# Patient Record
Sex: FEMALE | Race: WHITE | NOT HISPANIC OR LATINO | Employment: OTHER | URBAN - NONMETROPOLITAN AREA
[De-identification: names, ages, dates, MRNs, and addresses within clinical notes are randomized per-mention and may not be internally consistent; named-entity substitution may affect disease eponyms.]

---

## 2018-02-26 ENCOUNTER — TELEPHONE (OUTPATIENT)
Dept: OPHTHALMOLOGY | Age: 63
End: 2018-02-26

## 2018-02-27 ENCOUNTER — OFFICE VISIT (OUTPATIENT)
Dept: OPHTHALMOLOGY | Age: 63
End: 2018-02-27

## 2018-02-27 DIAGNOSIS — H43.812 POSTERIOR VITREOUS DETACHMENT OF LEFT EYE: Primary | ICD-10-CM

## 2018-02-27 PROCEDURE — 92014 COMPRE OPH EXAM EST PT 1/>: CPT | Performed by: OPHTHALMOLOGY

## 2018-02-27 ASSESSMENT — SLIT LAMP EXAM - LIDS
COMMENTS: 1+ DERMATOCHALASIS - UPPER LID
COMMENTS: 1+ DERMATOCHALASIS - UPPER LID

## 2018-02-27 ASSESSMENT — EXTERNAL EXAM - RIGHT EYE: OD_EXAM: NORMAL

## 2018-02-27 ASSESSMENT — VISUAL ACUITY
OD_CC+: -1
OS_CC: 20/25
OD_CC: 20/20
METHOD: SNELLEN - LINEAR
OS_CC+: -1
CORRECTION_TYPE: GLASSES

## 2018-02-27 ASSESSMENT — TONOMETRY
OD_IOP_MMHG: 16
OS_IOP_MMHG: 15
IOP_METHOD: APPLANATION

## 2018-02-27 ASSESSMENT — CONF VISUAL FIELD
OD_NORMAL: 1
OS_NORMAL: 1
METHOD: COUNTING FINGERS

## 2018-02-27 ASSESSMENT — EXTERNAL EXAM - LEFT EYE: OS_EXAM: NORMAL

## 2018-02-27 ASSESSMENT — CUP TO DISC RATIO
OD_RATIO: 0.6
OS_RATIO: 0.6

## 2018-04-09 ENCOUNTER — TELEPHONE (OUTPATIENT)
Dept: OPHTHALMOLOGY | Age: 63
End: 2018-04-09

## 2018-04-09 ENCOUNTER — OFFICE VISIT (OUTPATIENT)
Dept: OPHTHALMOLOGY | Age: 63
End: 2018-04-09

## 2018-04-09 DIAGNOSIS — H04.552 NASOLACRIMAL DUCT OBSTRUCTION, ACQUIRED, LEFT: ICD-10-CM

## 2018-04-09 DIAGNOSIS — H43.812 POSTERIOR VITREOUS DETACHMENT OF LEFT EYE: Primary | ICD-10-CM

## 2018-04-09 PROCEDURE — 99213 OFFICE O/P EST LOW 20 MIN: CPT | Performed by: OPHTHALMOLOGY

## 2018-04-09 RX ORDER — TOBRAMYCIN 3 MG/ML
1 SOLUTION/ DROPS OPHTHALMIC 4 TIMES DAILY
Qty: 5 ML | Refills: 0 | Status: SHIPPED | OUTPATIENT
Start: 2018-04-09 | End: 2018-04-16

## 2018-04-09 RX ORDER — TOBRAMYCIN AND DEXAMETHASONE 3; 1 MG/ML; MG/ML
1 SUSPENSION/ DROPS OPHTHALMIC 4 TIMES DAILY
Qty: 5 ML | Refills: 1 | Status: CANCELLED | OUTPATIENT
Start: 2018-04-09

## 2018-04-09 ASSESSMENT — VISUAL ACUITY
OS_CC: 20/40
OD_CC: 20/20
OS_PH_CC: NO IMPROVEMENT
METHOD: SNELLEN - LINEAR
OS_CC+: -1
CORRECTION_TYPE: GLASSES

## 2018-04-09 ASSESSMENT — CUP TO DISC RATIO
OD_RATIO: 0.6
OS_RATIO: 0.6

## 2018-04-09 ASSESSMENT — TONOMETRY
IOP_METHOD: APPLANATION
OS_IOP_MMHG: 14

## 2018-04-09 ASSESSMENT — SLIT LAMP EXAM - LIDS
COMMENTS: 1+ DERMATOCHALASIS - UPPER LID
COMMENTS: 1+ DERMATOCHALASIS - UPPER LID

## 2018-04-09 ASSESSMENT — EXTERNAL EXAM - RIGHT EYE: OD_EXAM: NORMAL

## 2018-04-09 ASSESSMENT — EXTERNAL EXAM - LEFT EYE: OS_EXAM: NORMAL

## 2018-04-18 ENCOUNTER — TELEPHONE (OUTPATIENT)
Dept: OPHTHALMOLOGY | Age: 63
End: 2018-04-18

## 2018-05-07 RX ORDER — PREDNISOLONE ACETATE 10 MG/ML
1 SUSPENSION/ DROPS OPHTHALMIC PRN
Qty: 5 ML | Refills: 2 | Status: SHIPPED | OUTPATIENT
Start: 2018-05-07 | End: 2019-04-04 | Stop reason: SDUPTHER

## 2019-02-19 ENCOUNTER — TELEPHONE (OUTPATIENT)
Dept: OPHTHALMOLOGY | Age: 64
End: 2019-02-19

## 2019-02-21 ENCOUNTER — OFFICE VISIT (OUTPATIENT)
Dept: OPHTHALMOLOGY | Age: 64
End: 2019-02-21

## 2019-02-21 DIAGNOSIS — H15.103 EPISCLERITIS OF BOTH EYES: Primary | ICD-10-CM

## 2019-02-21 PROCEDURE — 99213 OFFICE O/P EST LOW 20 MIN: CPT | Performed by: OPHTHALMOLOGY

## 2019-02-21 ASSESSMENT — EXTERNAL EXAM - LEFT EYE: OS_EXAM: NORMAL

## 2019-02-21 ASSESSMENT — TONOMETRY
OD_IOP_MMHG: 13
IOP_METHOD: APPLANATION
OS_IOP_MMHG: 13

## 2019-02-21 ASSESSMENT — CUP TO DISC RATIO
OS_RATIO: 0.6
OD_RATIO: 0.6

## 2019-02-21 ASSESSMENT — SLIT LAMP EXAM - LIDS
COMMENTS: 1+ DERMATOCHALASIS - UPPER LID
COMMENTS: 1+ DERMATOCHALASIS - UPPER LID

## 2019-02-21 ASSESSMENT — CONF VISUAL FIELD
OS_NORMAL: 1
OD_NORMAL: 1
METHOD: COUNTING FINGERS

## 2019-02-21 ASSESSMENT — VISUAL ACUITY
METHOD: SNELLEN - LINEAR
OS_PH_CC+: -2
CORRECTION_TYPE: GLASSES
OS_CC: 20/30
OS_PH_CC: 20/25
OD_CC+: -2
OD_CC: 20/25
OS_CC+: -1

## 2019-02-21 ASSESSMENT — EXTERNAL EXAM - RIGHT EYE: OD_EXAM: NORMAL

## 2019-02-28 ENCOUNTER — TELEPHONE (OUTPATIENT)
Dept: OPHTHALMOLOGY | Age: 64
End: 2019-02-28

## 2019-04-03 ENCOUNTER — TELEPHONE (OUTPATIENT)
Dept: OPHTHALMOLOGY | Age: 64
End: 2019-04-03

## 2019-04-03 RX ORDER — SULFAMETHOXAZOLE AND TRIMETHOPRIM 800; 160 MG/1; MG/1
1 TABLET ORAL 2 TIMES DAILY
Qty: 20 TABLET | Refills: 0 | Status: SHIPPED | OUTPATIENT
Start: 2019-04-03 | End: 2019-04-13

## 2019-04-04 ENCOUNTER — OFFICE VISIT (OUTPATIENT)
Dept: OPHTHALMOLOGY | Age: 64
End: 2019-04-04

## 2019-04-04 DIAGNOSIS — H04.552 ACQUIRED OBSTRUCTION OF LEFT NASOLACRIMAL DUCT: Primary | ICD-10-CM

## 2019-04-04 DIAGNOSIS — H52.7 REFRACTIVE ERROR: ICD-10-CM

## 2019-04-04 PROCEDURE — 92014 COMPRE OPH EXAM EST PT 1/>: CPT | Performed by: OPHTHALMOLOGY

## 2019-04-04 PROCEDURE — 92015 DETERMINE REFRACTIVE STATE: CPT | Performed by: OPHTHALMOLOGY

## 2019-04-04 RX ORDER — PREDNISOLONE ACETATE 10 MG/ML
1 SUSPENSION/ DROPS OPHTHALMIC PRN
Qty: 5 ML | Refills: 2 | Status: SHIPPED | OUTPATIENT
Start: 2019-04-04 | End: 2020-03-02 | Stop reason: SDUPTHER

## 2019-04-04 ASSESSMENT — TONOMETRY
OD_IOP_MMHG: 12
OS_IOP_MMHG: 12
IOP_METHOD: APPLANATION

## 2019-04-04 ASSESSMENT — REFRACTION_WEARINGRX
OD_CYLINDER: SPHERE
SPECS_TYPE: PROGRESSIVE
OD_ADD: +2.50
OS_SPHERE: -3.00
OS_ADD: +2.50
OS_CYLINDER: SPHERE
OD_SPHERE: -4.25

## 2019-04-04 ASSESSMENT — CONF VISUAL FIELD
METHOD: COUNTING FINGERS
OS_NORMAL: 1
OD_NORMAL: 1

## 2019-04-04 ASSESSMENT — REFRACTION_MANIFEST
OS_ADD: +2.50
OS_CYLINDER: SPHERE
OS_SPHERE: -3.75
OD_ADD: +2.50
OD_CYLINDER: SPHERE
OD_SPHERE: -4.00

## 2019-04-04 ASSESSMENT — VISUAL ACUITY
OS_CC+: -2
METHOD: SNELLEN - LINEAR
OD_CC: 20/20
OD_CC: 20/25
CORRECTION_TYPE: GLASSES
OS_CC: 20/30
OS_CC: 20/20-1

## 2019-04-04 ASSESSMENT — CUP TO DISC RATIO
OS_RATIO: 0.6
OD_RATIO: 0.6

## 2019-04-04 ASSESSMENT — EXTERNAL EXAM - RIGHT EYE: OD_EXAM: NORMAL

## 2019-04-04 ASSESSMENT — SLIT LAMP EXAM - LIDS
COMMENTS: 1+ DERMATOCHALASIS - UPPER LID
COMMENTS: 1+ DERMATOCHALASIS - UPPER LID

## 2019-04-11 ENCOUNTER — TELEPHONE (OUTPATIENT)
Dept: OPHTHALMOLOGY | Age: 64
End: 2019-04-11

## 2020-03-02 ENCOUNTER — TELEPHONE (OUTPATIENT)
Dept: OPHTHALMOLOGY | Age: 65
End: 2020-03-02

## 2020-03-02 RX ORDER — PREDNISOLONE ACETATE 10 MG/ML
1 SUSPENSION/ DROPS OPHTHALMIC PRN
Qty: 5 ML | Refills: 2 | Status: SHIPPED | OUTPATIENT
Start: 2020-03-02 | End: 2021-04-26 | Stop reason: SDUPTHER

## 2020-10-06 ENCOUNTER — OFFICE VISIT (OUTPATIENT)
Dept: OPHTHALMOLOGY | Age: 65
End: 2020-10-06

## 2020-10-06 DIAGNOSIS — H25.813 COMBINED FORMS OF AGE-RELATED CATARACT OF BOTH EYES: Primary | ICD-10-CM

## 2020-10-06 DIAGNOSIS — H52.4 MYOPIA WITH PRESBYOPIA OF BOTH EYES: ICD-10-CM

## 2020-10-06 DIAGNOSIS — H52.13 MYOPIA WITH PRESBYOPIA OF BOTH EYES: ICD-10-CM

## 2020-10-06 DIAGNOSIS — H15.103 EPISCLERITIS OF BOTH EYES: ICD-10-CM

## 2020-10-06 DIAGNOSIS — G43.109 OCULAR MIGRAINE: ICD-10-CM

## 2020-10-06 DIAGNOSIS — H04.552 ACQUIRED OBSTRUCTION OF LEFT NASOLACRIMAL DUCT: ICD-10-CM

## 2020-10-06 PROCEDURE — 92014 COMPRE OPH EXAM EST PT 1/>: CPT | Performed by: OPHTHALMOLOGY

## 2020-10-06 RX ORDER — ATORVASTATIN CALCIUM 10 MG/1
10 TABLET, FILM COATED ORAL DAILY
COMMUNITY
Start: 2020-03-16 | End: 2021-03-16

## 2020-10-06 ASSESSMENT — TONOMETRY
OD_IOP_MMHG: 14
OS_IOP_MMHG: 14
IOP_METHOD: APPLANATION

## 2020-10-06 ASSESSMENT — REFRACTION_WEARINGRX
OD_ADD: +2.50
OS_CYLINDER: SPHERE
OS_SPHERE: -3.25
OD_CYLINDER: SPHERE
OD_SPHERE: -4.00
OS_ADD: +2.50
SPECS_TYPE: PROGRESSIVE

## 2020-10-06 ASSESSMENT — VISUAL ACUITY
OD_CC: JAEGER 1
OS_CC: 20/25
CORRECTION_TYPE: GLASSES
OS_CC+: -2
OS_CC: JAEGER 1
OD_CC+: -1
METHOD: SNELLEN - LINEAR
OD_CC: 20/25

## 2020-10-06 ASSESSMENT — SLIT LAMP EXAM - LIDS
COMMENTS: 1+ DERMATOCHALASIS - UPPER LID
COMMENTS: 1+ DERMATOCHALASIS - UPPER LID

## 2020-10-06 ASSESSMENT — CUP TO DISC RATIO
OS_RATIO: 0.6
OD_RATIO: 0.6

## 2020-10-06 ASSESSMENT — EXTERNAL EXAM - RIGHT EYE: OD_EXAM: NORMAL

## 2020-10-06 ASSESSMENT — CONF VISUAL FIELD
OD_NORMAL: 1
METHOD: COUNTING FINGERS
OS_NORMAL: 1

## 2020-10-06 ASSESSMENT — ENCOUNTER SYMPTOMS
EYES NEGATIVE: 1
CONSTITUTIONAL NEGATIVE: 0

## 2021-04-26 RX ORDER — PREDNISOLONE ACETATE 10 MG/ML
1 SUSPENSION/ DROPS OPHTHALMIC PRN
Qty: 5 ML | Refills: 2 | Status: SHIPPED | OUTPATIENT
Start: 2021-04-26 | End: 2021-10-26 | Stop reason: ALTCHOICE

## 2021-05-21 ENCOUNTER — TELEPHONE (OUTPATIENT)
Dept: OPHTHALMOLOGY | Age: 66
End: 2021-05-21

## 2021-05-24 ENCOUNTER — OFFICE VISIT (OUTPATIENT)
Dept: OPHTHALMOLOGY | Age: 66
End: 2021-05-24

## 2021-05-24 ENCOUNTER — TELEPHONE (OUTPATIENT)
Dept: OPHTHALMOLOGY | Age: 66
End: 2021-05-24

## 2021-05-24 DIAGNOSIS — H04.301 DACRYOCYSTITIS OF RIGHT LACRIMAL SAC: Primary | ICD-10-CM

## 2021-05-24 DIAGNOSIS — H15.103 EPISCLERITIS OF BOTH EYES: ICD-10-CM

## 2021-05-24 DIAGNOSIS — H25.813 COMBINED FORMS OF AGE-RELATED CATARACT OF BOTH EYES: ICD-10-CM

## 2021-05-24 DIAGNOSIS — H52.13 MYOPIA WITH PRESBYOPIA OF BOTH EYES: ICD-10-CM

## 2021-05-24 DIAGNOSIS — H04.553 ACQUIRED OBSTRUCTION OF BOTH NASOLACRIMAL DUCTS: ICD-10-CM

## 2021-05-24 DIAGNOSIS — H52.4 MYOPIA WITH PRESBYOPIA OF BOTH EYES: ICD-10-CM

## 2021-05-24 DIAGNOSIS — G43.109 OCULAR MIGRAINE: ICD-10-CM

## 2021-05-24 PROCEDURE — 99213 OFFICE O/P EST LOW 20 MIN: CPT | Performed by: OPHTHALMOLOGY

## 2021-05-24 RX ORDER — OFLOXACIN 3 MG/ML
1 SOLUTION/ DROPS OPHTHALMIC 4 TIMES DAILY
Qty: 5 ML | Refills: 1 | Status: SHIPPED | OUTPATIENT
Start: 2021-05-24 | End: 2021-06-03

## 2021-05-24 RX ORDER — SULFAMETHOXAZOLE AND TRIMETHOPRIM 800; 160 MG/1; MG/1
1 TABLET ORAL 2 TIMES DAILY
Qty: 20 TABLET | Refills: 0 | Status: SHIPPED | OUTPATIENT
Start: 2021-05-24 | End: 2021-11-26 | Stop reason: SDUPTHER

## 2021-05-24 ASSESSMENT — ENCOUNTER SYMPTOMS
EYES NEGATIVE: 1
CONSTITUTIONAL NEGATIVE: 0

## 2021-05-24 ASSESSMENT — VISUAL ACUITY
METHOD: SNELLEN - LINEAR
OS_CC: 20/25
OS_CC+: -2
OD_CC: 20/30
CORRECTION_TYPE: GLASSES
OD_CC+: -2

## 2021-05-24 ASSESSMENT — TONOMETRY
OD_IOP_MMHG: 17
IOP_METHOD: APPLANATION

## 2021-05-24 ASSESSMENT — CUP TO DISC RATIO
OD_RATIO: 0.6
OS_RATIO: 0.6

## 2021-05-24 ASSESSMENT — EXTERNAL EXAM - LEFT EYE: OS_EXAM: NORMAL

## 2021-05-24 ASSESSMENT — SLIT LAMP EXAM - LIDS: COMMENTS: 1+ DERMATOCHALASIS - UPPER LID

## 2021-05-24 ASSESSMENT — CONF VISUAL FIELD: OS_NORMAL: 1

## 2021-05-25 ENCOUNTER — TELEPHONE (OUTPATIENT)
Dept: OPHTHALMOLOGY | Age: 66
End: 2021-05-25

## 2021-05-27 ENCOUNTER — TELEPHONE (OUTPATIENT)
Dept: OPHTHALMOLOGY | Age: 66
End: 2021-05-27

## 2021-05-28 ENCOUNTER — TELEPHONE (OUTPATIENT)
Dept: OPHTHALMOLOGY | Age: 66
End: 2021-05-28

## 2021-05-29 ENCOUNTER — TELEPHONE (OUTPATIENT)
Dept: OPHTHALMOLOGY | Age: 66
End: 2021-05-29

## 2021-06-01 ENCOUNTER — TELEPHONE (OUTPATIENT)
Dept: OPHTHALMOLOGY | Age: 66
End: 2021-06-01

## 2021-07-16 ENCOUNTER — TELEPHONE (OUTPATIENT)
Dept: OPHTHALMOLOGY | Age: 66
End: 2021-07-16

## 2021-09-03 ENCOUNTER — TELEPHONE (OUTPATIENT)
Dept: OPHTHALMOLOGY | Age: 66
End: 2021-09-03

## 2021-09-28 ENCOUNTER — TELEPHONE (OUTPATIENT)
Dept: OPHTHALMOLOGY | Age: 66
End: 2021-09-28

## 2021-09-30 ENCOUNTER — TELEPHONE (OUTPATIENT)
Dept: OPHTHALMOLOGY | Age: 66
End: 2021-09-30

## 2021-10-01 ENCOUNTER — OFFICE VISIT (OUTPATIENT)
Dept: OPHTHALMOLOGY | Age: 66
End: 2021-10-01

## 2021-10-01 ENCOUNTER — TELEPHONE (OUTPATIENT)
Dept: OPHTHALMOLOGY | Age: 66
End: 2021-10-01

## 2021-10-01 DIAGNOSIS — H15.103 EPISCLERITIS OF BOTH EYES: Primary | ICD-10-CM

## 2021-10-01 DIAGNOSIS — H25.813 COMBINED FORMS OF AGE-RELATED CATARACT OF BOTH EYES: ICD-10-CM

## 2021-10-01 DIAGNOSIS — G43.109 OCULAR MIGRAINE: ICD-10-CM

## 2021-10-01 DIAGNOSIS — H04.553 ACQUIRED OBSTRUCTION OF BOTH NASOLACRIMAL DUCTS: ICD-10-CM

## 2021-10-01 DIAGNOSIS — H52.4 MYOPIA WITH PRESBYOPIA OF BOTH EYES: ICD-10-CM

## 2021-10-01 DIAGNOSIS — H52.13 MYOPIA WITH PRESBYOPIA OF BOTH EYES: ICD-10-CM

## 2021-10-01 PROCEDURE — 99213 OFFICE O/P EST LOW 20 MIN: CPT | Performed by: OPHTHALMOLOGY

## 2021-10-01 RX ORDER — SULFAMETHOXAZOLE AND TRIMETHOPRIM 800; 160 MG/1; MG/1
TABLET ORAL
COMMUNITY
Start: 2021-09-28

## 2021-10-01 RX ORDER — OFLOXACIN 3 MG/ML
SOLUTION/ DROPS OPHTHALMIC
COMMUNITY
Start: 2021-09-28 | End: 2021-10-26 | Stop reason: ALTCHOICE

## 2021-10-01 ASSESSMENT — TONOMETRY
IOP_METHOD: APPLANATION
OD_IOP_MMHG: 15
OS_IOP_MMHG: 15

## 2021-10-01 ASSESSMENT — VISUAL ACUITY
OD_PH_CC+: -1
OD_PH_CC: 20/25
OD_CC+: +2
OD_CC: 20/40
CORRECTION_TYPE: GLASSES
METHOD: SNELLEN - LINEAR
OS_CC: 20/40
OS_PH_CC: 20/25

## 2021-10-01 ASSESSMENT — SLIT LAMP EXAM - LIDS: COMMENTS: 1+ DERMATOCHALASIS - UPPER LID

## 2021-10-01 ASSESSMENT — CUP TO DISC RATIO
OD_RATIO: 0.6
OS_RATIO: 0.6

## 2021-10-01 ASSESSMENT — ENCOUNTER SYMPTOMS
CONSTITUTIONAL NEGATIVE: 0
EYES NEGATIVE: 1

## 2021-10-01 ASSESSMENT — EXTERNAL EXAM - LEFT EYE: OS_EXAM: NORMAL

## 2021-10-05 ENCOUNTER — TELEPHONE (OUTPATIENT)
Dept: OPHTHALMOLOGY | Age: 66
End: 2021-10-05

## 2021-10-14 ENCOUNTER — TELEPHONE (OUTPATIENT)
Dept: OPHTHALMOLOGY | Age: 66
End: 2021-10-14

## 2021-10-26 ENCOUNTER — TRANSFERRED RECORDS (OUTPATIENT)
Dept: HEALTH INFORMATION MANAGEMENT | Facility: CLINIC | Age: 66
End: 2021-10-26
Payer: MEDICARE

## 2021-10-26 ENCOUNTER — OFFICE VISIT (OUTPATIENT)
Dept: OPHTHALMOLOGY | Age: 66
End: 2021-10-26

## 2021-10-26 DIAGNOSIS — H04.553 ACQUIRED OBSTRUCTION OF BOTH NASOLACRIMAL DUCTS: ICD-10-CM

## 2021-10-26 DIAGNOSIS — H52.4 MYOPIA WITH PRESBYOPIA OF BOTH EYES: ICD-10-CM

## 2021-10-26 DIAGNOSIS — H25.813 COMBINED FORMS OF AGE-RELATED CATARACT OF BOTH EYES: Primary | ICD-10-CM

## 2021-10-26 DIAGNOSIS — H15.103 EPISCLERITIS OF BOTH EYES: ICD-10-CM

## 2021-10-26 DIAGNOSIS — H52.13 MYOPIA WITH PRESBYOPIA OF BOTH EYES: ICD-10-CM

## 2021-10-26 DIAGNOSIS — G43.109 OCULAR MIGRAINE: ICD-10-CM

## 2021-10-26 PROCEDURE — 92015 DETERMINE REFRACTIVE STATE: CPT | Performed by: OPHTHALMOLOGY

## 2021-10-26 PROCEDURE — 92014 COMPRE OPH EXAM EST PT 1/>: CPT | Performed by: OPHTHALMOLOGY

## 2021-10-26 RX ORDER — ATORVASTATIN CALCIUM 10 MG/1
10 TABLET, FILM COATED ORAL DAILY
COMMUNITY

## 2021-10-26 ASSESSMENT — TONOMETRY
IOP_METHOD: APPLANATION
OS_IOP_MMHG: 15
OD_IOP_MMHG: 16

## 2021-10-26 ASSESSMENT — VISUAL ACUITY
OS_CC+: -1
METHOD: SNELLEN - LINEAR
OD_CC: JAEGER 2-
OD_CC+: -2
OD_CC: 20/25
CORRECTION_TYPE: GLASSES
OS_CC: JAEGER 2-
OS_CC: 20/25

## 2021-10-26 ASSESSMENT — REFRACTION_WEARINGRX
OD_SPHERE: -4.00
OD_CYLINDER: SPHERE
OS_SPHERE: -3.25
SPECS_TYPE: PROGRESSIVE
OD_ADD: +2.50
OS_CYLINDER: SPHERE
OS_ADD: +2.50

## 2021-10-26 ASSESSMENT — CUP TO DISC RATIO
OS_RATIO: 0.6
OD_RATIO: 0.6

## 2021-10-26 ASSESSMENT — SLIT LAMP EXAM - LIDS
COMMENTS: 1+ DERMATOCHALASIS - UPPER LID
COMMENTS: 1+ DERMATOCHALASIS - UPPER LID

## 2021-10-26 ASSESSMENT — EXTERNAL EXAM - RIGHT EYE: OD_EXAM: NORMAL

## 2021-10-26 ASSESSMENT — CONF VISUAL FIELD
OS_NORMAL: 1
OD_NORMAL: 1
METHOD: COUNTING FINGERS

## 2021-10-26 ASSESSMENT — REFRACTION_MANIFEST
OD_ADD: +2.50
OS_SPHERE: -2.50
OD_SPHERE: -3.25
OS_ADD: +2.50

## 2021-10-26 ASSESSMENT — EXTERNAL EXAM - LEFT EYE: OS_EXAM: NORMAL

## 2021-10-26 ASSESSMENT — ENCOUNTER SYMPTOMS
CONSTITUTIONAL NEGATIVE: 0
EYES NEGATIVE: 1

## 2021-11-26 ENCOUNTER — TELEPHONE (OUTPATIENT)
Dept: OPHTHALMOLOGY | Age: 66
End: 2021-11-26

## 2021-11-26 RX ORDER — SULFAMETHOXAZOLE AND TRIMETHOPRIM 800; 160 MG/1; MG/1
1 TABLET ORAL 2 TIMES DAILY
Qty: 20 TABLET | Refills: 0 | Status: SHIPPED | OUTPATIENT
Start: 2021-11-26 | End: 2021-12-06

## 2021-12-03 ENCOUNTER — TELEPHONE (OUTPATIENT)
Dept: OPHTHALMOLOGY | Age: 66
End: 2021-12-03

## 2021-12-17 ENCOUNTER — TELEPHONE (OUTPATIENT)
Dept: OPHTHALMOLOGY | Age: 66
End: 2021-12-17

## 2021-12-28 NOTE — TELEPHONE ENCOUNTER
FUTURE VISIT INFORMATION      FUTURE VISIT INFORMATION:    Date: 1/24/22    Time: 1:45pm    Location: [facility] [place]  REFERRAL INFORMATION:    Referring provider:  Dr Oliveira     Referring providers clinic:   [facility]     Reason for visit/diagnosis  blocked tear duct    RECORDS REQUESTED FROM:       Clinic name Comments Records Status Imaging Status    [facility]  Request for recs sent 12/28- received and sent to scanning 1/6 EPIC

## 2022-01-19 ENCOUNTER — TELEPHONE (OUTPATIENT)
Dept: OPHTHALMOLOGY | Facility: CLINIC | Age: 67
End: 2022-01-19
Payer: MEDICARE

## 2022-01-19 NOTE — TELEPHONE ENCOUNTER
Spoke with patient regarding rescheduling need due to Provider is out of clinic. Patient was able to reschedule for 2/14/2022 as offered for next available. Sent appointment letter to confirmed address.-Per Patient

## 2022-01-20 NOTE — TELEPHONE ENCOUNTER
FUTURE VISIT INFORMATION      FUTURE VISIT INFORMATION:    Date: 2/14/22    Time: 12:15pm    Location: [facility]  REFERRAL INFORMATION:    Referring provider:  Dr Oliveira     Referring providers clinic:   [facility]     Reason for visit/diagnosis  blocked tear duct     RECORDS REQUESTED FROM:         Clinic name Comments Records Status Imaging Status    [facility]  Request for recs sent 12/28- received and sent to scanning 1/6 EPIC

## 2022-01-24 ENCOUNTER — PRE VISIT (OUTPATIENT)
Dept: OPHTHALMOLOGY | Facility: CLINIC | Age: 67
End: 2022-01-24

## 2022-02-14 ENCOUNTER — EXTERNAL RECORD (OUTPATIENT)
Dept: OTHER | Age: 67
End: 2022-02-14

## 2022-02-14 ENCOUNTER — TELEPHONE (OUTPATIENT)
Dept: OPHTHALMOLOGY | Facility: CLINIC | Age: 67
End: 2022-02-14

## 2022-02-14 ENCOUNTER — OFFICE VISIT (OUTPATIENT)
Dept: OPHTHALMOLOGY | Facility: CLINIC | Age: 67
End: 2022-02-14
Payer: MEDICARE

## 2022-02-14 ENCOUNTER — PRE VISIT (OUTPATIENT)
Dept: OPHTHALMOLOGY | Facility: CLINIC | Age: 67
End: 2022-02-14

## 2022-02-14 VITALS — BODY MASS INDEX: 21.26 KG/M2 | WEIGHT: 120 LBS | HEIGHT: 63 IN

## 2022-02-14 DIAGNOSIS — H04.553 ACQUIRED STENOSIS OF BOTH NASOLACRIMAL DUCTS: Primary | ICD-10-CM

## 2022-02-14 PROCEDURE — 68801 DILATE TEAR DUCT OPENING: CPT | Mod: 50 | Performed by: OPHTHALMOLOGY

## 2022-02-14 PROCEDURE — 31231 NASAL ENDOSCOPY DX: CPT | Mod: GC | Performed by: OPHTHALMOLOGY

## 2022-02-14 PROCEDURE — 99203 OFFICE O/P NEW LOW 30 MIN: CPT | Mod: GC | Performed by: OPHTHALMOLOGY

## 2022-02-14 RX ORDER — ATORVASTATIN CALCIUM 10 MG/1
10 TABLET, FILM COATED ORAL EVERY EVENING
COMMUNITY
Start: 2021-11-18

## 2022-02-14 ASSESSMENT — VISUAL ACUITY
OS_CC: 20/25
CORRECTION_TYPE: GLASSES
OD_PH_CC+: -3
METHOD: SNELLEN - LINEAR
OD_PH_CC: 20/20
OD_CC: 20/25

## 2022-02-14 ASSESSMENT — TONOMETRY
OS_IOP_MMHG: 11
IOP_METHOD: ICARE
OD_IOP_MMHG: 10

## 2022-02-14 ASSESSMENT — MIFFLIN-ST. JEOR: SCORE: 1048.45

## 2022-02-14 ASSESSMENT — CONF VISUAL FIELD
OS_NORMAL: 1
COMMENTS: SOME PEAKING OU
OD_NORMAL: 1

## 2022-02-14 ASSESSMENT — EXTERNAL EXAM - RIGHT EYE: OD_EXAM: NORMAL

## 2022-02-14 ASSESSMENT — EXTERNAL EXAM - LEFT EYE: OS_EXAM: NORMAL

## 2022-02-14 NOTE — PROGRESS NOTES
Chief Complaints and History of Present Illnesses   Patient presents with     Consult For     Possible need for scope and irrigation. History with Acquired nasolacrimal duct obstruction both eyes with dacryocystitis.      Chief Complaint(s) and History of Present Illness(es)     Consult For     In both eyes.  Associated symptoms include tearing and redness.  Negative   for eye pain and discharge.  Treatments tried include no treatments.  Pain   was noted as 0/10. Additional comments: Possible need for scope and   irrigation. History with Acquired nasolacrimal duct obstruction both eyes   with dacryocystitis.               Comments     Initial issues with left eye 5 years ago left eye left eye had a possible   scope and irrigation and this and no infections with left eye as a result.   Watering each eye has been an issues for years. Last spring 2021 right eye   tear duct infection swollen with several infection until multiples. Had to   take a few rounds of oral antibiotics unitl this resolved(A sulfa drug was   used.)Many bouts of this with left eye Oct, November and December. Its   been two month now since symptoms an infection however each eye still   water a lot. Watering is intermittent with no pattern of onset.   She reports also having a History of episcleritis each eye. This also   needs to be treat aggressively when it does come about.   Not having any issues with vision issues.     Savanna Ash, COT COT 12:44 PM February 14, 2022                  History of tearing left eye>OD, but in May she noticed first episode of right nasal bump with purulent discharge with palpation, that spread to significant right periorbital edema and erythema s/p course of PO antibiotics. She had a recurrent episodes in Oct, Nov, Dec.     Assessment & Plan     Ela Cardenas is a 66 year old female with the following diagnoses:   1. Acquired stenosis of both nasolacrimal ducts       History of recurrent dacryocystitis -  right  Irrigation today - bilateral blockage  Nasal endoscopy - septum deviated towards the right    Discussed external vs endoscopic approach, and pt prefers external DCR    Plan:  Bilateral external DCR with silicone stents          Kelly Farr MD  Oculoplastics Fellow    Attending Physician Attestation:  I have seen and examined this patient with the fellow .  I have confirmed and edited as necessary the chief complaint(s), history of present illness, review of systems, relevant history, and examination findings as documented by others.  I have personally reviewed the relevant tests, images, and reports as documented above.  I have confirmed and edited as necessary the assessment and plan and agree with this note.    - Reinier Paul MD 1:26 PM 2/14/2022    Today with Ela Cardenas , I reviewed the indications, risks, benefits, and alternatives of the proposed surgical procedure including, but not limited to, failure obtain the desired result  and need for additional surgery, bleeding, infection, loss of vision, loss of the eye, and the remote possibility of permanent damage to any organ system or death with the use of anesthesia.  I provided multiple opportunities for the questions, answered all questions to the best of my ability, and confirmed that my answers and my discussion were understood.     - Reinier Paul MD 1:26 PM 2/14/2022

## 2022-02-14 NOTE — NURSING NOTE
Chief Complaints and History of Present Illnesses   Patient presents with     Consult For     Possible need for scope and irrigation. History with Acquired nasolacrimal duct obstruction both eyes with dacryocystitis.      Chief Complaint(s) and History of Present Illness(es)     Consult For     Laterality: both eyes    Associated symptoms: tearing and redness.  Negative for eye pain and discharge    Treatments tried: no treatments    Pain scale: 0/10    Comments: Possible need for scope and irrigation. History with Acquired nasolacrimal duct obstruction both eyes with dacryocystitis.               Comments     Initial issues with left eye 5 years ago left eye left eye had a possible scope and irrigation and this and no infections with left eye as a result. Watering each eye has been an issues for years. Last spring 2021 right eye tear duct infection swollen with several infection until multiples. Had to take a few rounds of oral antibiotics unitl this resolved(A sulfa drug was used.)Many bouts of this with left eye Oct, November and December. Its been two month now since symptoms an infection however each eye still water a lot. Watering is intermittent with no pattern of onset.   She reports also having a History of episcleritis each eye. This also needs to be treat aggressively when it does come about.   Not having any issues with vision issues.     Savanna Aleman, COT COT 12:44 PM February 14, 2022

## 2022-02-14 NOTE — PATIENT INSTRUCTIONS
TEAR SYSTEM  The tear film on the surface of the eye is a critical component of maintaining vision. Tears nourish and lubricate the surface of the eye as well as wash away debris. A smooth, balanced tear film (consisting of water, oil and mucus) also allows light to enter the eye in an optimal fashion. If there is a disturbance of the tear film, patients will often experience tearing, burning, irritation and most importantly blurred vision. Patients who experience tearing either have a problem with tear production or tear drainage.    Increased Tear Production and Dry Eyes  The eye has two sets of structures that produce tears. Smaller tear glands help maintain a baseline level of moisture on the surface of the eye. Unfortunately, inflammatory conditions like rheumatoid arthritis, Sjogrens disease as well as aging and menopause lead to decreased tear production. As tear production diminishes, the surface of the eye starts to dry out. Further, inflammation of the oil glands along the edge of the eyelid, common in patients with roseacea, also causes early breakdown and evaporation of the tear film. The brain senses the eye is both dry and irritated and in turn signals the main tear gland to flush the eye. As a result, the dry eye paradoxically tears and becomes watery. Patients with dry eyes note intermittent tearing of the eyes during activities like reading, driving, watching TV, using a computer or going outside on a windy day. These all cause the eye to dry out because the eye blinks less during these activities. The treatment for dry eyes includes 1) replacing tears with artificial lubricants which can be bought over the counter, 2) medications like Restasis that decrease inflammation in tear glands and encourages natural tear production to resume and finally 3) plugging of the tear drain. Other causes of increased tear production exist like allergies, infections and eyelashes poking the eye. These conditions can  often be found during examination.       What Are the Causes of Obstructed Tear Ducts?  An obstruction of the tear ducts may occur due to numerous reasons (aging, trauma, inflammatory conditions, medications and tumors) and cause numerous signs and symptoms ranging from wateriness or tearing to discharge, swelling, pain and infection. These signs and symptoms may result from the tear drainage system becoming obstructed at any point from the puncta to the nasal cavity.       What Are the Symptoms of Obstructed Tear Ducts?  If the tear passageways become blocked, tears cannot drain properly and may overflow from the eyelids onto the face as if you were crying. In addition to excessive tearing you may also experience blurred vision, mucous discharge, eye irritation, and painful swelling in the inner corner of the eyelids. A thorough examination by an ophthalmic plastic surgeon can determine the cause of tearing and recommended treatment.     How is an Obstructed Tear Duct Treated or Repaired?   Depending on your symptoms and their severity, your specialist will suggest an appropriate course. In mild cases, a treatment of warm compresses and antibiotics may be recommended. In more severe cases, surgical intervention to bypass the tear duct obstruction (dacryocystorhinostomy or DCR surgery) may be recommended. A DCR is performed by creating a new tear passageway from the lacrimal sac to the nose, bypassing the obstruction. This procedure may be perfomed through a small incision between the eye and nose (external DCR) or through the nose using a special lighted telescope (endoscopic DCR). A small silicone tube called a stent may temporarily be placed in the new passageway to keep it open during the healing process. In a small percentage of cases, the obstruction is between the puncta and the lacrimal sac. In these cases, in addition to the DCR procedure, the surgeon will insert a tiny artificial tear drain called a Martin  Tube. A Martin Tube is made of Pyrex glass and allows tears to drain directly from the eye to the lacrimal sac.       Where Is The Surgery Performed?  DCR surgery is usually performed as an outpatient procedure. Patients usually have some bruising and swelling on the side of the nose that subsides in one to two weeks. In general, surgery has a greater than 90% success rate and most patients experience a resolution of their tearing and discharge problems once surgery and recovery are completed.       Who Should Perform DCR Surgery?  When choosing a surgeon to perform a dacryocystorhinostomy or DCR, look for an ophthalmic plastic reconstructive and cosmetic surgeon who specializes in the eyelids, orbit, and tear drain system. It s also important that he or she is a member of the American Society of Ophthalmic Plastic and Reconstructive Surgery (ASOPRS). Dr. Paul membership in the American Society of Ophthalmic Plastic and Reconstructive Surgery (ASOPRS) indicates he is not only a board certified ophthalmologist who knows the anatomy and structure of the eyelids and orbit, but also has had extensive training in ophthalmic plastic reconstructive and cosmetic surgery.

## 2022-02-14 NOTE — LETTER
2022         RE:  :  MRN: Ela Cardenas  1955  [id number]     Dear Dr. Oliveira,    Thank you for asking me to see your patient, Ela Cardenas, for an oculoplastic   consultation.  My assessment and plan are below.  For further details, please see my attached clinic note.      History of tearing left eye>OD, but in May she noticed first episode of right nasal bump with purulent discharge with palpation, that spread to significant right periorbital edema and erythema s/p course of PO antibiotics. She had a recurrent episodes in Oct, Nov, Dec.     Assessment & Plan     Ela Cardenas is a 66 year old female with the following diagnoses:   1. Acquired stenosis of both nasolacrimal ducts       History of recurrent dacryocystitis - right  Irrigation today - bilateral blockage  Nasal endoscopy - septum deviated towards the right    Discussed external vs endoscopic approach, and pt prefers external DCR    Plan:  Bilateral external DCR with silicone stents        Again, thank you for allowing me to participate in the care of your patient.      Sincerely,    Reinier Paul MD  Department of Ophthalmology and Visual Neurosciences  [facility]    CC: Tate Samayoa MD  [facility]  [id number] [facility] [address]  Via Fax: [phone]     Royer Oliveira MD  [facility]  [facility] [address] [facility] [address]  Via Fax: [phone]

## 2022-02-14 NOTE — TELEPHONE ENCOUNTER
Met with patient to schedule surgery with Dr. Reinier Paul.    Surgery was scheduled on 04/20 at [facility]  Patient will have H&P at [facility]     Patient is aware a COVID-19 test is needed before their procedure. The test should be with-in 4 days of their procedure.   Test Details: Patient will schedule locally at [facility]    Post-Op visit was scheduled on 05/02 via phone   Patient is aware a / is needed day of surgery.   Surgery packet was given, patient has my direct contact information for any further questions.

## 2022-03-10 DIAGNOSIS — Z11.59 ENCOUNTER FOR SCREENING FOR OTHER VIRAL DISEASES: Primary | ICD-10-CM

## 2022-04-19 ENCOUNTER — ANESTHESIA EVENT (OUTPATIENT)
Dept: SURGERY | Facility: AMBULATORY SURGERY CENTER | Age: 67
End: 2022-04-19
Payer: MEDICARE

## 2022-04-20 ENCOUNTER — HOSPITAL ENCOUNTER (OUTPATIENT)
Facility: AMBULATORY SURGERY CENTER | Age: 67
Discharge: HOME | End: 2022-04-20
Attending: OPHTHALMOLOGY
Payer: MEDICARE

## 2022-04-20 ENCOUNTER — ANESTHESIA (OUTPATIENT)
Dept: SURGERY | Facility: AMBULATORY SURGERY CENTER | Age: 67
End: 2022-04-20
Payer: MEDICARE

## 2022-04-20 VITALS
HEART RATE: 85 BPM | TEMPERATURE: 97.2 F | DIASTOLIC BLOOD PRESSURE: 86 MMHG | RESPIRATION RATE: 14 BRPM | BODY MASS INDEX: 21.26 KG/M2 | OXYGEN SATURATION: 97 % | SYSTOLIC BLOOD PRESSURE: 135 MMHG | WEIGHT: 120 LBS | HEIGHT: 63 IN

## 2022-04-20 DIAGNOSIS — H04.553 ACQUIRED STENOSIS OF BOTH NASOLACRIMAL DUCTS: ICD-10-CM

## 2022-04-20 PROCEDURE — 68815 PROBE NASOLACRIMAL DUCT: CPT | Mod: 50 | Performed by: OPHTHALMOLOGY

## 2022-04-20 PROCEDURE — 68720 CREATE TEAR SAC DRAIN: CPT | Mod: LT

## 2022-04-20 PROCEDURE — 68720 CREATE TEAR SAC DRAIN: CPT | Mod: 50 | Performed by: OPHTHALMOLOGY

## 2022-04-20 PROCEDURE — 68815 PROBE NASOLACRIMAL DUCT: CPT | Mod: LT

## 2022-04-20 RX ORDER — LIDOCAINE HYDROCHLORIDE AND EPINEPHRINE 10; 10 MG/ML; UG/ML
INJECTION, SOLUTION INFILTRATION; PERINEURAL PRN
Status: DISCONTINUED | OUTPATIENT
Start: 2022-04-20 | End: 2022-04-20 | Stop reason: HOSPADM

## 2022-04-20 RX ORDER — PROPOFOL 10 MG/ML
INJECTION, EMULSION INTRAVENOUS PRN
Status: DISCONTINUED | OUTPATIENT
Start: 2022-04-20 | End: 2022-04-20

## 2022-04-20 RX ORDER — FENTANYL CITRATE 50 UG/ML
25 INJECTION, SOLUTION INTRAMUSCULAR; INTRAVENOUS EVERY 5 MIN PRN
Status: DISCONTINUED | OUTPATIENT
Start: 2022-04-20 | End: 2022-04-20 | Stop reason: HOSPADM

## 2022-04-20 RX ORDER — LIDOCAINE 40 MG/G
CREAM TOPICAL
Status: DISCONTINUED | OUTPATIENT
Start: 2022-04-20 | End: 2022-04-20 | Stop reason: HOSPADM

## 2022-04-20 RX ORDER — FENTANYL CITRATE 50 UG/ML
INJECTION, SOLUTION INTRAMUSCULAR; INTRAVENOUS PRN
Status: DISCONTINUED | OUTPATIENT
Start: 2022-04-20 | End: 2022-04-20

## 2022-04-20 RX ORDER — ERYTHROMYCIN 5 MG/G
OINTMENT OPHTHALMIC PRN
Status: DISCONTINUED | OUTPATIENT
Start: 2022-04-20 | End: 2022-04-20 | Stop reason: HOSPADM

## 2022-04-20 RX ORDER — LIDOCAINE HYDROCHLORIDE 20 MG/ML
INJECTION, SOLUTION INFILTRATION; PERINEURAL PRN
Status: DISCONTINUED | OUTPATIENT
Start: 2022-04-20 | End: 2022-04-20

## 2022-04-20 RX ORDER — PROPOFOL 10 MG/ML
INJECTION, EMULSION INTRAVENOUS CONTINUOUS PRN
Status: DISCONTINUED | OUTPATIENT
Start: 2022-04-20 | End: 2022-04-20

## 2022-04-20 RX ORDER — HYDRALAZINE HYDROCHLORIDE 20 MG/ML
2.5-5 INJECTION INTRAMUSCULAR; INTRAVENOUS EVERY 10 MIN PRN
Status: DISCONTINUED | OUTPATIENT
Start: 2022-04-20 | End: 2022-04-20 | Stop reason: HOSPADM

## 2022-04-20 RX ORDER — DEXAMETHASONE SODIUM PHOSPHATE 4 MG/ML
INJECTION, SOLUTION INTRA-ARTICULAR; INTRALESIONAL; INTRAMUSCULAR; INTRAVENOUS; SOFT TISSUE PRN
Status: DISCONTINUED | OUTPATIENT
Start: 2022-04-20 | End: 2022-04-20

## 2022-04-20 RX ORDER — OXYCODONE HYDROCHLORIDE 5 MG/1
5 TABLET ORAL EVERY 6 HOURS PRN
Qty: 12 TABLET | Refills: 0 | Status: SHIPPED | OUTPATIENT
Start: 2022-04-20 | End: 2022-04-23

## 2022-04-20 RX ORDER — GLYCOPYRROLATE 0.2 MG/ML
INJECTION, SOLUTION INTRAMUSCULAR; INTRAVENOUS PRN
Status: DISCONTINUED | OUTPATIENT
Start: 2022-04-20 | End: 2022-04-20

## 2022-04-20 RX ORDER — COCAINE HYDROCHLORIDE 40 MG/ML
SOLUTION NASAL PRN
Status: DISCONTINUED | OUTPATIENT
Start: 2022-04-20 | End: 2022-04-20 | Stop reason: HOSPADM

## 2022-04-20 RX ORDER — OXYCODONE HYDROCHLORIDE 5 MG/1
5 TABLET ORAL EVERY 6 HOURS PRN
Qty: 8 TABLET | Refills: 0 | Status: SHIPPED | OUTPATIENT
Start: 2022-04-20 | End: 2022-04-20

## 2022-04-20 RX ORDER — AMOXICILLIN AND CLAVULANATE POTASSIUM 500; 125 MG/1; MG/1
1 TABLET, FILM COATED ORAL 2 TIMES DAILY
Qty: 14 TABLET | Refills: 0 | Status: SHIPPED | OUTPATIENT
Start: 2022-04-20 | End: 2022-04-27

## 2022-04-20 RX ORDER — FENTANYL CITRATE 50 UG/ML
25 INJECTION, SOLUTION INTRAMUSCULAR; INTRAVENOUS
Status: DISCONTINUED | OUTPATIENT
Start: 2022-04-20 | End: 2022-04-22 | Stop reason: HOSPADM

## 2022-04-20 RX ORDER — ONDANSETRON 4 MG/1
4 TABLET, ORALLY DISINTEGRATING ORAL EVERY 30 MIN PRN
Status: DISCONTINUED | OUTPATIENT
Start: 2022-04-20 | End: 2022-04-22 | Stop reason: HOSPADM

## 2022-04-20 RX ORDER — HYDROMORPHONE HYDROCHLORIDE 1 MG/ML
0.2 INJECTION, SOLUTION INTRAMUSCULAR; INTRAVENOUS; SUBCUTANEOUS EVERY 5 MIN PRN
Status: DISCONTINUED | OUTPATIENT
Start: 2022-04-20 | End: 2022-04-20 | Stop reason: HOSPADM

## 2022-04-20 RX ORDER — ONDANSETRON 2 MG/ML
INJECTION INTRAMUSCULAR; INTRAVENOUS PRN
Status: DISCONTINUED | OUTPATIENT
Start: 2022-04-20 | End: 2022-04-20

## 2022-04-20 RX ORDER — SODIUM CHLORIDE, SODIUM LACTATE, POTASSIUM CHLORIDE, CALCIUM CHLORIDE 600; 310; 30; 20 MG/100ML; MG/100ML; MG/100ML; MG/100ML
INJECTION, SOLUTION INTRAVENOUS CONTINUOUS
Status: DISCONTINUED | OUTPATIENT
Start: 2022-04-20 | End: 2022-04-20 | Stop reason: HOSPADM

## 2022-04-20 RX ORDER — SODIUM CHLORIDE, SODIUM LACTATE, POTASSIUM CHLORIDE, CALCIUM CHLORIDE 600; 310; 30; 20 MG/100ML; MG/100ML; MG/100ML; MG/100ML
INJECTION, SOLUTION INTRAVENOUS CONTINUOUS
Status: DISCONTINUED | OUTPATIENT
Start: 2022-04-20 | End: 2022-04-22 | Stop reason: HOSPADM

## 2022-04-20 RX ORDER — TETRACAINE HYDROCHLORIDE 5 MG/ML
SOLUTION OPHTHALMIC PRN
Status: DISCONTINUED | OUTPATIENT
Start: 2022-04-20 | End: 2022-04-20 | Stop reason: HOSPADM

## 2022-04-20 RX ORDER — ACETAMINOPHEN 325 MG/1
975 TABLET ORAL ONCE
Status: COMPLETED | OUTPATIENT
Start: 2022-04-20 | End: 2022-04-20

## 2022-04-20 RX ORDER — OXYCODONE HYDROCHLORIDE 5 MG/1
5 TABLET ORAL EVERY 4 HOURS PRN
Status: DISCONTINUED | OUTPATIENT
Start: 2022-04-20 | End: 2022-04-22 | Stop reason: HOSPADM

## 2022-04-20 RX ORDER — ONDANSETRON 2 MG/ML
4 INJECTION INTRAMUSCULAR; INTRAVENOUS EVERY 30 MIN PRN
Status: DISCONTINUED | OUTPATIENT
Start: 2022-04-20 | End: 2022-04-22 | Stop reason: HOSPADM

## 2022-04-20 RX ORDER — ERYTHROMYCIN 5 MG/G
OINTMENT OPHTHALMIC
Qty: 7 G | Refills: 0 | Status: SHIPPED | OUTPATIENT
Start: 2022-04-20 | End: 2022-06-27

## 2022-04-20 RX ORDER — METOPROLOL TARTRATE 1 MG/ML
1-2 INJECTION, SOLUTION INTRAVENOUS EVERY 5 MIN PRN
Status: DISCONTINUED | OUTPATIENT
Start: 2022-04-20 | End: 2022-04-20 | Stop reason: HOSPADM

## 2022-04-20 RX ADMIN — SODIUM CHLORIDE, SODIUM LACTATE, POTASSIUM CHLORIDE, CALCIUM CHLORIDE: 600; 310; 30; 20 INJECTION, SOLUTION INTRAVENOUS at 11:25

## 2022-04-20 RX ADMIN — ACETAMINOPHEN 975 MG: 325 TABLET ORAL at 09:18

## 2022-04-20 RX ADMIN — FENTANYL CITRATE 50 MCG: 50 INJECTION, SOLUTION INTRAMUSCULAR; INTRAVENOUS at 11:34

## 2022-04-20 RX ADMIN — Medication 100 MCG: at 12:35

## 2022-04-20 RX ADMIN — DEXAMETHASONE SODIUM PHOSPHATE 4 MG: 4 INJECTION, SOLUTION INTRA-ARTICULAR; INTRALESIONAL; INTRAMUSCULAR; INTRAVENOUS; SOFT TISSUE at 11:33

## 2022-04-20 RX ADMIN — Medication 100 MCG: at 12:07

## 2022-04-20 RX ADMIN — ONDANSETRON 4 MG: 2 INJECTION INTRAMUSCULAR; INTRAVENOUS at 11:33

## 2022-04-20 RX ADMIN — LIDOCAINE HYDROCHLORIDE 60 MG: 20 INJECTION, SOLUTION INFILTRATION; PERINEURAL at 11:33

## 2022-04-20 RX ADMIN — Medication 100 MCG: at 11:55

## 2022-04-20 RX ADMIN — Medication 100 MCG: at 11:46

## 2022-04-20 RX ADMIN — Medication 100 MCG: at 12:15

## 2022-04-20 RX ADMIN — FENTANYL CITRATE 50 MCG: 50 INJECTION, SOLUTION INTRAMUSCULAR; INTRAVENOUS at 11:38

## 2022-04-20 RX ADMIN — GLYCOPYRROLATE 0.2 MG: 0.2 INJECTION, SOLUTION INTRAMUSCULAR; INTRAVENOUS at 11:33

## 2022-04-20 RX ADMIN — Medication 100 MCG: at 12:00

## 2022-04-20 RX ADMIN — Medication 100 MCG: at 11:48

## 2022-04-20 RX ADMIN — Medication 100 MCG: at 12:21

## 2022-04-20 RX ADMIN — PROPOFOL 150 MCG/KG/MIN: 10 INJECTION, EMULSION INTRAVENOUS at 11:33

## 2022-04-20 RX ADMIN — PROPOFOL 100 MG: 10 INJECTION, EMULSION INTRAVENOUS at 11:33

## 2022-04-20 NOTE — DISCHARGE INSTRUCTIONS
Post-operative Instructions    Ophthalmic Plastic and Reconstructive Surgery  Reinier Paul M.D.  Kelly Farr M.D.    All instructions apply to the operated eye(s) or eyelid(s)    What to expect after surgery:  Thre will be some swelling, bruising, and likely a black eye (even into the lower eyelids and cheeks). Also expect crusting and discharge from the eye and/or incisions.   A small amount of surface bleeding is normal for the first 48 hours after surgery.  You may notice some bloody tears for the first few days after surgery. This is normal.  Your eye(s) and eyelid(s) may be painful and tender. This is normal after surgery. Use the pain medication as prescribed. If your pain does not improve despite the medication, contact the office.    Wound care and personal care:  If a patch or bandage has been placed, please leave this in place until seen in clinic. Prevent the bandage from getting wet.   Apply ice compresses 15 minutes on 15 minutes off while awake for the first 2 days after surgery, then switch to warm compresses 4 times a day until seen by your physician.   For warm packs you can place a cup of dry uncooked rice in a clean cotton sock. Place sock in microwave 30 seconds to one minute. Next place the warm sock into a plastic bag and wrap the bag with clean warm wet washcloth and place over operated eye.    You may shower or wash your hair the day after surgery. Do not bathe or go swimming for 1 week to prevent contamination of your wounds.  Do not apply make-up to the eyes or eyelids for 2 weeks after surgery.  Do not blow your nose or drink hot liquids for 1 week after your surgery.  Do not pull at the small tube in the nose or corner of the eye.    Activity restrictions and driving:  Avoid heavy lifting, bending, exercise or strenuous activity for 1 week after surgery.  You may resume other activities and return to work as tolerated.  You may not resume driving until have you stopped using  narcotic pain medications(such as Norco, Percocet, Tylenol #3).  Sinus Precautions for 1 week: Sneeze with mouth open. Cough with mouth open. No blowing nose. No straws. No bending over.    Medications:  Restart all your regular home medications and eye drops today. If you take Plavix or Aspirin on a regular basis, wait for 3 days after your surgery before restarting these in order to decrease the risk of bleeding complications.  Avoid aspirin and aspirin-like medications (Motrin, Aleve, Ibuprofen, Gabbie-Montrose etc) for 5 days to reduce the risk of bleeding. You may take Tylenol (acetaminophen) for pain.  In addition to your home medications, take the following post-operative medications as prescribed by your physician:  Apply antibiotic ointment (erythromycin) to all sutures three times a day, and into the operated eye(s) at night.   Instill eye drops (Maxitrol) four times a day until the bottle finished.   Antibiotic - take as directed   Take 1 to 2 pain pills (norco or oxycodone as prescribed) as needed for pain up to every 4 hours.  The pain pills may make you drowsy. You must not drive a car, operate heavy machinery or drink alcohol while taking them.  The pain pills may cause constipation and nausea. Take them with some food to prevent a stomach upset. If you continue to experience nausea, call your physician.    WARNING: All the prescription pain medications listed above contain Tylenol (acetaminophen). You must not take more than 4,000 mg of acetaminophen per 24-hour period. This is equivalent to 6 tablets of Darvocet, 8 tablets of Vicodin, or 12 tablets of Norco, Percocet or Tylenol #3. If you take other over-the-counter medications containing acetaminophen, you must take the amount of acetaminophen into account and reduce the number of prescribed pain pills accordingly.    Contact information and follow-up:  Return to the Eye Clinic for a follow-up appointment with your physician as  scheduled. If no  appointment has been scheduled, call [phone] for an  appointment with Dr. Paul within 1 to 2 weeks from your date of surgery.    Please email a few photos of your eye(s) or other operative site(s) to [email or web address] prior to your follow up visit.    For severe pain, bleeding, or loss of vision, call the Eye Clinic at [phone].  After hours or on weekends and holidays, call [phone] and ask to speak with the ophthalmologist on call.      [facility]  Home Care Following Anesthesia  For 24 hours after surgery:  Get plenty of rest.  A responsible adult must stay with you for at least 24 hours after you leave the surgery center.  Do not drive or use heavy equipment.  If you have weakness or tingling, don't drive or use heavy equipment until this feeling goes away.   Do not drink alcohol.   Avoid strenuous or risky activities.  Ask for help when climbing stairs.  You may feel lightheaded.  IF so, sit for a few minutes before standing.  Have someone help you get up.   If you have nausea (feel sick to your stomach): Drink only clear liquids such as apple juice, ginger ale, broth or 7-Up.  Rest may also help.  Be sure to drink enough fluids.  Move to a regular diet as you feel able.   You may have a slight fever.  Call the doctor if your fever is over 100 F (37.7 C) (taken under the tongue) or lasts longer than 24 hours.  You may have a dry mouth, a sore throat, muscle aches or trouble sleeping. These should go away after 24 hours.  Do not make important or legal decisions.   It is recommended to avoid smoking.               Tips for taking pain medications  To get the best pain relief possible, remember these points:  Take pain medications as directed, before pain becomes severe.  Pain medication can upset your stomach: taking it with food may help.  Constipation is a common side effect of pain medication. Drink plenty of  fluids.  Eat foods high in fiber. Take  a stool softener if recommended by your doctor or pharmacist.  Do not drink alcohol, drive or operate machinery while taking pain medications.  Ask about other ways to control pain, such as with heat, ice or relaxation.    Tylenol/Acetaminophen Consumption  To help encourage the safe use of acetaminophen, the makers of TYLENOL  have lowered the maximum daily dose for single-ingredient Extra Strength TYLENOL  (acetaminophen) products sold in the [place] from 8 pills per day (4,000 mg) to 6 pills per day (3,000 mg). The dosing interval has also changed from 2 pills every 4-6 hours to 2 pills every 6 hours.  If you feel your pain relief is insufficient, you may take Tylenol/Acetaminophen in addition to your narcotic pain medication.   Be careful not to exceed 3,000 mg of Tylenol/Acetaminophen in a 24 hour period from all sources.  If you are taking extra strength Tylenol/acetaminophen (500 mg), the maximum dose is 6 tablets in 24 hours.  If you are taking regular strength acetaminophen (325 mg), the maximum dose is 9 tablets in 24 hours.    Call a doctor for any of the following:  Signs of infection (fever, growing tenderness at the surgery site, a large amount of drainage or bleeding, severe pain, foul-smelling drainage, redness, swelling).  It has been over 8 to 10 hours since surgery and you are still not able to urinate (pass water).  Headache for over 24 hours.  Numbness, tingling or weakness the day after surgery (if you had spinal anesthesia).  Signs of Covid-19 infection (temperature over 100 degrees, shortness of breath, cough, loss of taste/smell, generalized body aches, persistent headache, chills, sore throat, nausea/vomiting/diarrhea)  Your doctor is:  Dr. Reinier Paul, Ophthalmology: [phone]                    Or dial [phone] and ask for the resident on call for:  Ophthalmology  For emergency care, call the:  [facility]:  [phone] (TTY for hearing impaired:  [phone])

## 2022-04-20 NOTE — ANESTHESIA POSTPROCEDURE EVALUATION
Patient: Ela Cardenas    Procedure: Procedure(s):  BILATERAL DACRYOCYSTORHINOSTOMY with silicone intubation       Anesthesia Type:  General    Note:  Disposition: Outpatient   Postop Pain Control: Uneventful            Sign Out: Well controlled pain   PONV: No   Neuro/Psych: Uneventful            Sign Out: Acceptable/Baseline neuro status   Airway/Respiratory: Uneventful            Sign Out: Acceptable/Baseline resp. status   CV/Hemodynamics: Uneventful            Sign Out: Acceptable CV status; No obvious hypovolemia; No obvious fluid overload   Other NRE: NONE   DID A NON-ROUTINE EVENT OCCUR? No           Last vitals:  Vitals Value Taken Time   /88 04/20/22 1315   Temp 36  C (96.8  F) 04/20/22 1249   Pulse 97 04/20/22 1315   Resp 14 04/20/22 1315   SpO2 97 % 04/20/22 1315       Electronically Signed By: Ancelmo Jay MD  April 20, 2022  2:26 PM

## 2022-04-20 NOTE — ANESTHESIA CARE TRANSFER NOTE
Patient: Ela Cardenas    Procedure: Procedure(s):  BILATERAL DACRYOCYSTORHINOSTOMY with silicone intubation       Diagnosis: Acquired stenosis of both nasolacrimal ducts [H04.553]  Diagnosis Additional Information: No value filed.    Anesthesia Type:   General     Note:    Oropharynx: oropharynx clear of all foreign objects  Level of Consciousness: awake  Oxygen Supplementation: room air    Independent Airway: airway patency satisfactory and stable  Dentition: dentition unchanged  Vital Signs Stable: post-procedure vital signs reviewed and stable  Report to RN Given: handoff report given  Patient transferred to: PACU    Handoff Report: Identifed the Patient, Identified the Reponsible Provider, Reviewed the pertinent medical history, Discussed the surgical course, Reviewed Intra-OP anesthesia mangement and issues during anesthesia, Set expectations for post-procedure period and Allowed opportunity for questions and acknowledgement of understanding      Vitals:  Vitals Value Taken Time   BP     Temp     Pulse     Resp     SpO2         Electronically Signed By: FELICIA Graf CRNA  April 20, 2022  12:52 PM

## 2022-04-20 NOTE — OP NOTE
PREOPERATIVE DIAGNOSIS: Bilateral complete nasolacrimal duct obstruction.   POSTOPERATIVE DIAGNOSIS: Bilateral complete nasolacrimal duct obstruction.   PROCEDURE: Bilateral dacryocystorhinostomy with silicone intubation.   SURGEON: Reinier Paul M.D.  ASSISTANTS: Kelly Farr MD    ANESTHESIA: General anesthesia with local infiltration of 1% lidocaine with epinephrine.   COMPLICATIONS: None.   ESTIMATED BLOOD LOSS: Less than 5 mL.   HISTORY: Ela Cardenas  presented with complete nasolacrimal duct obstruction leading to tearing and chronic irritation. After the risks, benefits and alternatives to the proposed procedure were explained, informed consent was obtained.   DESCRIPTION OF PROCEDURE: Ela Cardenas  was brought to the operating room and placed supine on the operating table. General anesthesia was given. A medial canthal incision was drawn in the tear trough extending 15 mm in an inferotemporal direction.  The medial canthus and lateral nasal wall were infiltrated with local anesthetic mixture. Four percent cocaine soaked neuropaddies were placed into the nose in the region of the middle meatus.   The area  was prepped and draped in the typical fashion. Attention was directed to the right side. Medial canthal incision was made with a 15 blade and dissection was carried down through the orbicularis with monopolar cautery. [place] elevator was used to elevate the periorbita from the lacrimal fossa.The neuropaddies were removed from the nose. Using a 4mm ludmila giacomo an osteotomy was created at the anterior lacrimal crest. Using a seeker probe, the mucosa was stripped from the bone. The nasal mucosa was infiltrated with local anesthetic.The thin posterior lacrimal bone was infractured with the [place] elevator. The osteotomy was created using Kerrison and Leksell rongeurs. Approximately a 12 mm osteotomy was created. The 0 Magana probe was placed in the lacrimal sac through the upper canaliculus. The  lacrimal sac was opened vertically with the keratome. A posterior flap of lacrimal sac was removed. The nasal mucosa was incised with a keratome in a U-shaped anteriorly based flap. The silicone intubation set was used to intubate the upper and lower canalicular system and retrieved in the nose. The lacrimal sac and nasal mucosa were sutured together with interrupted 5-0 Vicryl sutures. Orbicularis layer was closed with running 5-0 Vicryl suture. Skin was closed with running 6-0 plain gut suture. The silicone tubes were tied and trimmed at the naris. The same procedure was performed on the left side.  Erythromycin ophthalmic ointment was applied to the incision.The patient tolerated the procedure well and  left the operating room in stable condition.   KATHY ESQUIVEL MD

## 2022-04-20 NOTE — BRIEF OP NOTE
[facility] Brief Operative Note    Pre-operative diagnosis: Acquired stenosis of both nasolacrimal ducts [H04.553]   Post-operative diagnosis Same   Procedure: Procedure(s):  BILATERAL DACRYOCYSTORHINOSTOMY with silicone intubation   Surgeon(s): Surgeon(s) and Role:     * Reinier Paul MD - Primary   Kelly Farr MD - first assistant   Thomas Crowell MD - second assistant    Estimated blood loss: 2cc    Specimens: None   Findings: As expected     My privilege to be part of your care,  Thomas Crowell MD, MSc  Ophthalmology PGY-2 resident physician  Pager: [phone]

## 2022-04-20 NOTE — BRIEF OP NOTE
[facility]    Brief Operative Note    Pre-operative diagnosis: Acquired stenosis of both nasolacrimal ducts [H04.553]  Post-operative diagnosis Same as pre-operative diagnosis    Procedure: Procedure(s):  BILATERAL DACRYOCYSTORHINOSTOMY with silicone intubation  Surgeon: Surgeon(s) and Role:     * Reinier Paul MD - Primary   Kelly Farr MD - fellow  Thomas Crowell MD - resident  Anesthesia: General   Estimated Blood Loss: Minimal    Drains: None  Specimens: * No specimens in log *  Findings:   as expected.  Complications: None.  Implants:   Implant Name Type Inv. Item Serial No.  Lot No. LRB No. Used Action   [id number] [id number] visitec  intubation set     [id number] Bilateral 1 Implanted

## 2022-04-20 NOTE — ANESTHESIA PREPROCEDURE EVALUATION
Anesthesia Pre-Procedure Evaluation    Patient: Ela Cardenas   MRN: [id number] : 1955        Procedure : Procedure(s):  BILATERAL DACRYOCYSTORHINOSTOMY with silicone intubation          No past medical history on file.   History reviewed. No pertinent surgical history.   No Known Allergies   Social History     Tobacco Use     Smoking status: Never Smoker     Smokeless tobacco: Never Used   Substance Use Topics     Alcohol use: Not on file      Wt Readings from Last 1 Encounters:   22 54.4 kg (120 lb)        Anesthesia Evaluation            ROS/MED HX  ENT/Pulmonary:  - neg pulmonary ROS     Neurologic:  - neg neurologic ROS     Cardiovascular:     (+) Dyslipidemia ----- (-) murmur   METS/Exercise Tolerance: >4 METS    Hematologic:  - neg hematologic  ROS     Musculoskeletal:  - neg musculoskeletal ROS     GI/Hepatic:  - neg GI/hepatic ROS     Renal/Genitourinary:  - neg Renal ROS     Endo:  - neg endo ROS     Psychiatric/Substance Use:  - neg psychiatric ROS     Infectious Disease:  - neg infectious disease ROS     Malignancy:  - neg malignancy ROS     Other:  - neg other ROS          Physical Exam    Airway        Mallampati: II   TM distance: > 3 FB   Neck ROM: full   Mouth opening: > 3 cm    Respiratory Devices and Support         Dental  no notable dental history         Cardiovascular          Rhythm and rate: regular and normal (-) no murmur    Pulmonary   pulmonary exam normal        breath sounds clear to auscultation           OUTSIDE LABS:  CBC: No results found for: WBC, HGB, HCT, PLT  BMP: No results found for: NA, POTASSIUM, CHLORIDE, CO2, BUN, CR, GLC  COAGS: No results found for: PTT, INR, FIBR  POC: No results found for: BGM, HCG, HCGS  HEPATIC: No results found for: ALBUMIN, PROTTOTAL, ALT, AST, GGT, ALKPHOS, BILITOTAL, BILIDIRECT, OSCAR  OTHER: No results found for: PH, LACT, A1C, CHONG, PHOS, MAG, LIPASE, AMYLASE, TSH, T4, T3, CRP, SED    Anesthesia Plan    ASA Status:  2   NPO  Status:  NPO Appropriate    Anesthesia Type: General.     - Airway: LMA   Induction: Intravenous, Propofol.   Maintenance: Balanced.        Consents    Anesthesia Plan(s) and associated risks, benefits, and realistic alternatives discussed. Questions answered and patient/representative(s) expressed understanding.    - Discussed:     - Discussed with:  Patient      - Specific Concerns: sore throat, dental damage, post op pain/nausea, rare major complications .     - Extended Intubation/Ventilatory Support Discussed: No.      - Patient is DNR/DNI Status: No    Use of blood products discussed: No .     Postoperative Care    Pain management: IV analgesics, Oral pain medications, Multi-modal analgesia.   PONV prophylaxis: Ondansetron (or other 5HT-3), Dexamethasone or Solumedrol, Background Propofol Infusion     Comments:           H&P reviewed: Unable to attach H&P to encounter due to EHR limitations. H&P Update: appropriate H&P reviewed, patient examined. No interval changes since H&P (within 30 days).         Ancelmo Jay MD

## 2022-05-02 ENCOUNTER — VIRTUAL VISIT (OUTPATIENT)
Dept: OPHTHALMOLOGY | Facility: CLINIC | Age: 67
End: 2022-05-02
Payer: MEDICARE

## 2022-05-02 ENCOUNTER — TELEPHONE (OUTPATIENT)
Dept: OPHTHALMOLOGY | Facility: CLINIC | Age: 67
End: 2022-05-02

## 2022-05-02 DIAGNOSIS — Z98.890 POSTOPERATIVE EYE STATE: ICD-10-CM

## 2022-05-02 DIAGNOSIS — H04.553 ACQUIRED STENOSIS OF BOTH NASOLACRIMAL DUCTS: Primary | ICD-10-CM

## 2022-05-02 PROCEDURE — 99024 POSTOP FOLLOW-UP VISIT: CPT | Mod: 95 | Performed by: OPHTHALMOLOGY

## 2022-05-02 NOTE — TELEPHONE ENCOUNTER
Called patient and gave her the right email which is [email or web address]. Patient is happy       [facility] Call Center    Phone Message    May a detailed message be left on voicemail: yes     Reason for Call: Other: Pt calling to get the correct email to send a picture to Dr Paul, as he gave her an email address to send him a picture, but email is not going through, per Pt.  Please call Pt back to discuss.   Thank you.    Action Taken: Message routed to:  [facility] EYE    Travel Screening: Not Applicable

## 2022-05-02 NOTE — TELEPHONE ENCOUNTER
Spoke to patient regarding scheduling a follow up visit for Return in about 2 months (around 7/2/2022) for stent, RETURN OCULOPLASTICS, ENDOSCOPY. Patient is scheduled accordingly for an in-person visit. Patient is aware of the day and location.

## 2022-05-02 NOTE — TELEPHONE ENCOUNTER
[facility] Call Center    Phone Message    May a detailed message be left on voicemail: yes     Reason for Call: Other: Ela called to report that her right stent has moved closer to the pupil. She states that it is still attached, however, it has moved & wanted to let Dr. Paul know before her telephone appointmeny today (5/2) at 9:00am. Just an FYI - please follow up with this during telephone call this AM. Thank you!     Action Taken: Message routed to:  [facility] Eye    Travel Screening: Not Applicable

## 2022-05-02 NOTE — TELEPHONE ENCOUNTER
Called and spoke with patient, she also already spoke with Dr. Paul. Feels like she was able to massage the tube back into place, it is not rubbing on her eye and seems to be in good position per patient.  Genoveva Valladares RN RN 10:15 AM 05/02/22

## 2022-05-02 NOTE — PROGRESS NOTES
Ela is a 66 year old who is being evaluated via a billable telephone visit.      What phone number would you like to be contacted at? mobile  How would you like to obtain your AVS? Marcy Morales   Ela is a 66 year old who presents for the following health issues    Postop op check     HPI     S/p bilat Dacryocystorhinostomy (DCR) on 4/20/2022    Review of Systems   Constitutional, HEENT, cardiovascular, pulmonary, gi and gu systems are negative, except as otherwise noted.      Objective           Vitals:  No vitals were obtained today due to virtual visit.    Physical Exam   healthy, alert and no distress  PSYCH: Alert and oriented times 3; coherent speech, normal   rate and volume, able to articulate logical thoughts, able   to abstract reason, no tangential thoughts, no hallucinations   or delusions  Her affect is normal  RESP: No cough, no audible wheezing, able to talk in full sentences  Remainder of exam unable to be completed due to telephone visits      No chief complaint on file.    Chief Complaint(s) and History of Present Illness(es)     No visit information to display             Assessment & Plan     Ela Cardenas is a 66 year old female with the following diagnoses:   1. Acquired stenosis of both nasolacrimal ducts    2. Postoperative eye state       Return to clinic 2 months for stent            Attending Physician Attestation:  I personally called this patient. Complete documentation of historical and exam elements from today's encounter can be found in the full encounter summary report (not reduplicated in this progress note).  I personally obtained the chief complaint(s) and history of present illness.  I confirmed and edited as necessary the review of systems, past medical/surgical history, family history, and social history.  I formulated and edited as necessary the assessment and plan and discussed the findings and management plan with the patient and family.     -Reinier ASH  MD Humberto          Phone call duration: 5 minutes

## 2022-06-27 ENCOUNTER — OFFICE VISIT (OUTPATIENT)
Dept: OPHTHALMOLOGY | Facility: CLINIC | Age: 67
End: 2022-06-27
Payer: MEDICARE

## 2022-06-27 DIAGNOSIS — Z98.890 POSTOPERATIVE EYE STATE: Primary | ICD-10-CM

## 2022-06-27 PROBLEM — M72.0 DUPUYTREN'S DISEASE OF PALM OF BOTH HANDS: Status: ACTIVE | Noted: 2022-04-07

## 2022-06-27 PROBLEM — H52.4 MYOPIA WITH PRESBYOPIA OF BOTH EYES: Status: ACTIVE | Noted: 2020-10-06

## 2022-06-27 PROBLEM — M75.41 SHOULDER IMPINGEMENT SYNDROME, RIGHT: Status: ACTIVE | Noted: 2019-09-20

## 2022-06-27 PROBLEM — H25.813 COMBINED FORMS OF AGE-RELATED CATARACT OF BOTH EYES: Status: ACTIVE | Noted: 2020-10-06

## 2022-06-27 PROBLEM — H52.13 MYOPIA WITH PRESBYOPIA OF BOTH EYES: Status: ACTIVE | Noted: 2020-10-06

## 2022-06-27 PROBLEM — M71.371 OTHER BURSAL CYST, RIGHT ANKLE AND FOOT: Status: ACTIVE | Noted: 2017-08-17

## 2022-06-27 PROBLEM — G43.109 OCULAR MIGRAINE: Status: ACTIVE | Noted: 2020-10-06

## 2022-06-27 PROCEDURE — 99024 POSTOP FOLLOW-UP VISIT: CPT | Performed by: OPHTHALMOLOGY

## 2022-06-27 ASSESSMENT — TONOMETRY
OS_IOP_MMHG: 12
OD_IOP_MMHG: 10
IOP_METHOD: ICARE

## 2022-06-27 ASSESSMENT — VISUAL ACUITY
CORRECTION_TYPE: GLASSES
OD_CC: 20/25
OD_CC+: -
OS_CC: 20/25
METHOD: SNELLEN - LINEAR

## 2022-06-27 ASSESSMENT — SLIT LAMP EXAM - LIDS
COMMENTS: NORMAL
COMMENTS: NORMAL

## 2022-06-27 ASSESSMENT — EXTERNAL EXAM - RIGHT EYE: OD_EXAM: NORMAL

## 2022-06-27 ASSESSMENT — EXTERNAL EXAM - LEFT EYE: OS_EXAM: NORMAL

## 2022-06-27 NOTE — PROGRESS NOTES
Chief Complaints and History of Present Illnesses   Patient presents with     Post Op (Ophthalmology) Both Eyes     POM#2 s/p Bilateral DCR silicone     Chief Complaint(s) and History of Present Illness(es)     Post Op (Ophthalmology) Both Eyes     In both eyes.  Associated symptoms include Negative for dryness and eye   pain.  Treatments tried include no treatments.  Pain was noted as 0/10.   Additional comments: POM#2 s/p Bilateral DCR silicone              Comments     Pt notes that she has scarring on both sides of nose, she states they are   hard.     Elvi Muniz COT June 27, 2022 12:59 PM                       Assessment & Plan     Ela Cardenas is a 66 year old female with the following diagnoses:   1. Postoperative eye state    stents out  Massage with scar gel  Return to clinic as needed                  Attending Physician Attestation:  Complete documentation of historical and exam elements from today's encounter can be found in the full encounter summary report (not reduplicated in this progress note).  I personally obtained the chief complaint(s) and history of present illness.  I confirmed and edited as necessary the review of systems, past medical/surgical history, family history, social history, and examination findings as documented by others; and I examined the patient myself.  I personally reviewed the relevant tests, images, and reports as documented above.  I formulated and edited as necessary the assessment and plan and discussed the findings and management plan with the patient and family. I personally reviewed the ophthalmic test(s) associated with this encounter, and have completed the corresponding report(s) as necessary.   -Reinier Paul MD

## 2022-06-27 NOTE — NURSING NOTE
Chief Complaints and History of Present Illnesses   Patient presents with     Post Op (Ophthalmology) Both Eyes     POM#2 s/p Bilateral DCR silicone     Chief Complaint(s) and History of Present Illness(es)     Post Op (Ophthalmology) Both Eyes     Laterality: both eyes    Associated symptoms: Negative for dryness and eye pain    Treatments tried: no treatments    Pain scale: 0/10    Comments: POM#2 s/p Bilateral DCR silicone              Comments     Pt notes that she has scarring on both sides of nose, she states they are hard.     Elvi Muniz COT June 27, 2022 12:59 PM

## 2022-08-25 DIAGNOSIS — H10.9 BACTERIAL CONJUNCTIVITIS OF LEFT EYE: Primary | ICD-10-CM

## 2022-08-25 RX ORDER — OFLOXACIN 3 MG/ML
1 SOLUTION/ DROPS OPHTHALMIC 4 TIMES DAILY
Qty: 3 ML | Refills: 0 | Status: SHIPPED | OUTPATIENT
Start: 2022-08-25 | End: 2022-09-08

## 2022-09-30 ENCOUNTER — TELEPHONE (OUTPATIENT)
Dept: OPHTHALMOLOGY | Facility: CLINIC | Age: 67
End: 2022-09-30

## 2022-09-30 DIAGNOSIS — H04.553 ACQUIRED STENOSIS OF BOTH NASOLACRIMAL DUCTS: Primary | ICD-10-CM

## 2022-09-30 DIAGNOSIS — H10.9 BACTERIAL CONJUNCTIVITIS OF LEFT EYE: Primary | ICD-10-CM

## 2022-09-30 RX ORDER — OFLOXACIN 3 MG/ML
1-2 SOLUTION/ DROPS OPHTHALMIC 4 TIMES DAILY
Qty: 5 ML | Refills: 0 | Status: CANCELLED | OUTPATIENT
Start: 2022-09-30

## 2022-09-30 RX ORDER — MOXIFLOXACIN 5 MG/ML
1 SOLUTION/ DROPS OPHTHALMIC 4 TIMES DAILY
Qty: 3 ML | Refills: 0 | Status: SHIPPED | OUTPATIENT
Start: 2022-09-30 | End: 2022-10-14

## 2022-09-30 NOTE — TELEPHONE ENCOUNTER
reviewed by Dr. Kinney    Rx for vigamox sent to pt's pharmacy for 14 day use    Updated pt and review to contact clinic for any worsening symptoms/vision changes or if not having any improvement    Pt verbally demonstrated understanding and seemed comfortable with plan    John Cedeño RN 1:43 PM 09/30/22    ---      S/p bilateral Dacryocystorihinostomy with silicone tube intubation    8- august had some new left eye swelling in duct area with yellow drainage    Pt prescribed Ofloxacin 4/day for 2 weeks which improved symptoms for about 3-4 weeks.    Yesterday new yellow discharge in same area on left eye    No swelling yet (previous took couple days last time before swelling started)    No redness  No pain  No fever    Before surgery patient had also been prescribed oral anti-biotics per pt for symptoms    Will review plan with Dr. Kinney and call pt back.    Pt now lives in [place] and does not have regular eye provider    Scheduled evaluation with Dr. Paul on November 7th    John Cedeño RN 12:43 PM 09/30/22              [facility]    Phone Message    May a detailed message be left on voicemail: yes     Reason for Call: Other: Pt said the yellow fluid is back and wants to know if she can get another round of antibiotics. Please call to discuss. Thank you.      Action Taken: Message routed to:  [facility] EYE    Travel Screening: Not Applicable

## 2022-11-07 ENCOUNTER — OFFICE VISIT (OUTPATIENT)
Dept: OPHTHALMOLOGY | Facility: CLINIC | Age: 67
End: 2022-11-07
Payer: MEDICARE

## 2022-11-07 DIAGNOSIS — H04.553 ACQUIRED STENOSIS OF BOTH NASOLACRIMAL DUCTS: Primary | ICD-10-CM

## 2022-11-07 DIAGNOSIS — Z98.890 POSTOPERATIVE EYE STATE: ICD-10-CM

## 2022-11-07 PROCEDURE — 31231 NASAL ENDOSCOPY DX: CPT | Performed by: OPHTHALMOLOGY

## 2022-11-07 PROCEDURE — 68810 PROBE NASOLACRIMAL DUCT: CPT | Mod: 50 | Performed by: OPHTHALMOLOGY

## 2022-11-07 PROCEDURE — 99213 OFFICE O/P EST LOW 20 MIN: CPT | Mod: 25 | Performed by: OPHTHALMOLOGY

## 2022-11-07 RX ORDER — SULFAMETHOXAZOLE/TRIMETHOPRIM 800-160 MG
1 TABLET ORAL 2 TIMES DAILY
Qty: 20 TABLET | Refills: 0 | Status: SHIPPED | OUTPATIENT
Start: 2022-11-07

## 2022-11-07 RX ORDER — OFLOXACIN 3 MG/ML
1-2 SOLUTION/ DROPS OPHTHALMIC 4 TIMES DAILY
Qty: 5 ML | Refills: 0 | Status: SHIPPED | OUTPATIENT
Start: 2022-11-07

## 2022-11-07 ASSESSMENT — VISUAL ACUITY
CORRECTION_TYPE: GLASSES
OD_CC+: -2
OS_CC: 20/25
OD_CC: 20/25
METHOD: SNELLEN - LINEAR

## 2022-11-07 ASSESSMENT — CONF VISUAL FIELD
METHOD: COUNTING FINGERS
OS_SUPERIOR_NASAL_RESTRICTION: 0
OD_SUPERIOR_TEMPORAL_RESTRICTION: 0
OS_NORMAL: 1
OD_NORMAL: 1
OS_SUPERIOR_TEMPORAL_RESTRICTION: 0
OS_INFERIOR_TEMPORAL_RESTRICTION: 0
OD_INFERIOR_TEMPORAL_RESTRICTION: 0
OS_INFERIOR_NASAL_RESTRICTION: 0
OD_INFERIOR_NASAL_RESTRICTION: 0
OD_SUPERIOR_NASAL_RESTRICTION: 0

## 2022-11-07 ASSESSMENT — TONOMETRY
IOP_METHOD: ICARE
OS_IOP_MMHG: 11
OD_IOP_MMHG: 12

## 2022-11-07 NOTE — NURSING NOTE
Chief Complaints and History of Present Illnesses   Patient presents with     Eye Discharge Left Eye     Pt here for recurrent discharge left eye since silicone intubation in April 2022. S/p bilat Dacryocystorhinostomy (DCR) on 4/20/2022     Chief Complaint(s) and History of Present Illness(es)     Eye Discharge Left Eye            Laterality: left eye    Associated signs and symptoms: mattering and discharge.  Negative for eye pain    Comments: Pt here for recurrent discharge left eye since silicone intubation in April 2022. S/p bilat Dacryocystorhinostomy (DCR) on 4/20/2022          Comments    Pt notes tearing left eye with yellow discharge. Pt says she is confident they are infections and had two different antibiotics. Pt has been on antibiotic drops continually since August with very little time off. Pt also endorses facial swelling and pus. Pt finished drops 2 days ago.     EUGENE LARA 12:19 PM November 7, 2022

## 2022-11-07 NOTE — PROGRESS NOTES
Chief Complaints and History of Present Illnesses   Patient presents with     Eye Discharge Left Eye     Pt here for recurrent discharge left eye since silicone intubation in April 2022. S/p bilat Dacryocystorhinostomy (DCR) on 4/20/2022     Chief Complaint(s) and History of Present Illness(es)     Eye Discharge Left Eye    In left eye.  Associated symptoms include mattering and discharge.    Negative for eye pain. Additional comments: Pt here for recurrent   discharge left eye since silicone intubation in April 2022. S/p bilat   Dacryocystorhinostomy (DCR) on 4/20/2022     Comments    Pt notes tearing left eye with yellow discharge. Pt says she is confident   they are infections and had two different antibiotics. Pt has been on   antibiotic drops continually since August with very little time off. Pt   also endorses facial swelling and pus. Pt finished drops 2 days ago.     EUGENE LARA 12:19 PM November 7, 2022          Assessment & Plan     Ela Cardenas is a 67 year old female with the following diagnoses:   1. Acquired stenosis of both nasolacrimal ducts    2. Postoperative eye state       Left block  Right open    PLAN:  Ocuflox twice a day  Sulfa DS twice a day x 10 days as needed  Schedule LEFT endoDCR revise with mitomycin-C and stent              Attending Physician Attestation:  Complete documentation of historical and exam elements from today's encounter can be found in the full encounter summary report (not reduplicated in this progress note).  I personally obtained the chief complaint(s) and history of present illness.  I confirmed and edited as necessary the review of systems, past medical/surgical history, family history, social history, and examination findings as documented by others; and I examined the patient myself.  I personally reviewed the relevant tests, images, and reports as documented above.  I formulated and edited as necessary the assessment and plan and discussed the findings  and management plan with the patient and family. I personally reviewed the ophthalmic test(s) associated with this encounter, agree with the interpretation(s) as documented and have edited the corresponding report(s) as necessary.   -Reinier Paul MD  12:35 PM 11/7/2022    Today with Ela Cardenas  and her , I reviewed the indications, risks, benefits, and alternatives of the proposed surgical procedure including, but not limited to, failure obtain the desired result  and need for additional surgery, bleeding, infection, loss of vision, loss of the eye, and the remote possibility of permanent damage to any organ system or death with the use of anesthesia.  I provided multiple opportunities for the questions, answered all questions to the best of my ability, and confirmed that my answers and my discussion were understood.     - Reinier Paul MD 12:47 PM 11/7/2022

## 2022-11-08 NOTE — TELEPHONE ENCOUNTER
FUTURE VISIT INFORMATION      SURGERY INFORMATION:    Date: 23    [facility]    Surgeon:  Reinier Paul MD    Anesthesia Type:  General    Procedure: LEFT DACRYOCYSTORHINOSTOMY-ENDOSCOPIC WITH MITOMYCIN AND STENT    Consult: ov 22    RECORDS REQUESTED FROM:       [facility]    Most recent EKG+ Tracin19- [facility]

## 2022-11-10 ENCOUNTER — TELEPHONE (OUTPATIENT)
Dept: OPHTHALMOLOGY | Facility: CLINIC | Age: 67
End: 2022-11-10

## 2022-11-10 NOTE — TELEPHONE ENCOUNTER
Met with patient to schedule surgery with .Humberto      Surgery was scheduled on 1/11 at [facility]  Patient will have H&P at [facility]     Patient is aware a COVID-19 test is needed before their procedure.   Patient will have a home test due to outpatient status.    Phone Post-Op visit was scheduled on 1/30 photos will be sent to [email or web address]  Patient is aware a / is needed day of surgery.   Surgery packet was given, patient has my direct contact information for any further questions.

## 2023-01-03 ENCOUNTER — ANESTHESIA EVENT (OUTPATIENT)
Dept: SURGERY | Facility: AMBULATORY SURGERY CENTER | Age: 68
End: 2023-01-03
Payer: MEDICARE

## 2023-01-03 ENCOUNTER — VIRTUAL VISIT (OUTPATIENT)
Dept: SURGERY | Facility: CLINIC | Age: 68
End: 2023-01-03
Payer: MEDICARE

## 2023-01-03 ENCOUNTER — PRE VISIT (OUTPATIENT)
Dept: SURGERY | Facility: CLINIC | Age: 68
End: 2023-01-03

## 2023-01-03 DIAGNOSIS — H04.553 ACQUIRED STENOSIS OF BOTH NASOLACRIMAL DUCTS: ICD-10-CM

## 2023-01-03 DIAGNOSIS — Z01.818 PRE-OP EXAMINATION: Primary | ICD-10-CM

## 2023-01-03 PROCEDURE — 99202 OFFICE O/P NEW SF 15 MIN: CPT | Mod: 95 | Performed by: PHYSICIAN ASSISTANT

## 2023-01-03 ASSESSMENT — ENCOUNTER SYMPTOMS: SEIZURES: 0

## 2023-01-03 ASSESSMENT — PAIN SCALES - GENERAL: PAINLEVEL: NO PAIN (0)

## 2023-01-03 ASSESSMENT — LIFESTYLE VARIABLES: TOBACCO_USE: 0

## 2023-01-03 NOTE — PATIENT INSTRUCTIONS
Preparing for Your Surgery      Name:  Ela Cardenas   MRN:  [id number]   :  1955   Today's Date:  1/3/2023         Arriving for surgery:  Surgery date:  23  Arrival time:  5:45AM    Restrictions due to COVID 19:    Effective 2022:  2 visitors may accompany patient and wait in the Surgery Waiting Room.  All visitors must wear a mask and social distance.      parking is available for anyone with mobility limitations or disabilities. (Monday- Friday 7 am- 5 pm)    Please come to:    [facility]  [address] [place] [id number]    Please check in on the 5th floor at the Ambulatory Surgery Center.      What can I eat or drink?    -  You may eat and drink normally until 8 hours prior to arrival  time. (Until 1/10/23, 9:45PM)  -  You may have clear liquids until 2 hours prior to arrival  time. (Until 23, 3:45AM)    Examples of clear liquids:  Water  Clear broth  Juices (apple, white grape, white cranberry  and cider) without pulp  Noncarbonated, powder based beverages  (lemonade and Omega-Aid)  Sodas (Sprite, 7-Up, ginger ale and seltzer)  Coffee or tea (without milk or cream)  Gatorade    --No alcohol for at least 24 hours before surgery.    Which medicines can I take?    Hold Aspirin for 7 days before surgery.   Hold Multivitamins for 7 days before surgery.  Hold Supplements for 7 days before surgery.  Hold Ibuprofen (Advil, Motrin) for 1 day before surgery--unless otherwise directed by surgeon.  Hold Naproxen (Aleve) for 4 days before surgery.      -  PLEASE TAKE the following medications the day of surgery:     Ocuflox eye drops    How do I prepare myself?  - Please take 2 showers before surgery using Scrubcare or Hibiclens soap.    Use this soap only from the neck to your toes.     Leave the soap on your skin for one minute--then rinse thoroughly.      You may use your own shampoo and conditioner. No other hair products.   - Please remove all jewelry and  body piercings.  - No lotions, deodorants or fragrance.  - No makeup or fingernail polish.   - Bring your ID and insurance card.    -If you have a Deep Brain Stimulator, a Spinal Cord Stimulator, or any implanted Neuro Device, you must bring the remote to the Surgery Center.         ALL PATIENTS ARE REQUIRED TO HAVE A RESPONSIBLE ADULT TO DRIVE AND BE IN ATTENDANCE WITH THEM FOR 24 HOURS FOLLOWING SURGERY.     Covid testing policy as of 12/06/2022  Your surgeon will notify and schedule you for a COVID test if one is needed before surgery--please direct any questions or COVID symptoms to your surgeon      Questions or Concerns:    -For questions regarding the day of surgery, please contact the Ambulatory Surgery Center at [phone].    -If you have health changes between today and your surgery, please contact your surgeon.     - For questions after surgery, please contact your surgeon's office.

## 2023-01-03 NOTE — H&P
Pre-Operative H & P     CC:  Preoperative exam to assess for increased cardiopulmonary risk while undergoing surgery and anesthesia.    Date of Encounter: 1/3/2023  Primary Care Physician:  Tate Samayoa     Reason for visit:   Encounter Diagnoses   Name Primary?     Pre-op examination Yes     Acquired stenosis of both nasolacrimal ducts        HPI  Ela Cardenas is a 67 year old female who presents for pre-operative H & P in preparation for  Procedure Information     Case: [id number] Date/Time: 01/11/23 0715    Procedure: LEFT DACRYOCYSTORHINOSTOMY-ENDOSCOPIC WITH MITOMYCIN AND STENT (Left: Eye)    Anesthesia type: General    Diagnosis: Acquired stenosis of both nasolacrimal ducts [H04.553]    Pre-op diagnosis: Acquired stenosis of both nasolacrimal ducts [H04.553]    Location: [place] [address] / [facility]    Providers: Reinier Paul MD          The patient is a 67-year-old woman with a past medical history significant for hyperlipidemia, prediabetes, Dupuytren's contracture and history of stenosis of bilateral nasolacrimal ducts.  The patient underwent bilateral dacryocystorhinostomy with silicone intubation on 4/20/2022.  She has continued to follow with Dr. Paul and was last seen on 11/7/2022 and counseled for the procedure as above.    History is obtained from the patient and chart review    Hx of abnormal bleeding or anti-platelet use: none    Menstrual history: No LMP recorded. Patient is postmenopausal.:      Past Medical History  Past Medical History:   Diagnosis Date     Acquired stenosis of both nasolacrimal ducts      Dupuytren's contracture      HLD (hyperlipidemia)      Ocular migraine      Pre-diabetes        Past Surgical History  Past Surgical History:   Procedure Laterality Date     DACRYOCYSTORHINOSTOMY Bilateral 04/20/2022    Procedure: BILATERAL DACRYOCYSTORHINOSTOMY with silicone intubation;  Surgeon: Reinier Paul MD;  Location:  [facility] OR     DILATION AND CURETTAGE, DIAGNOSTIC / THERAPEUTIC      x2     EXCISE BREAST CYST/FIBROADENOMA/TUMOR/DUCT LESION/NIPPLE LESION/AREOLAR LESION  1985       Prior to Admission Medications  Current Outpatient Medications   Medication Sig Dispense Refill     atorvastatin (LIPITOR) 10 MG tablet Take 10 mg by mouth every evening       ofloxacin (OCUFLOX) 0.3 % ophthalmic solution Place 1-2 drops Into the left eye 4 times daily (Patient taking differently: Place 1-2 drops Into the left eye 2 times daily) 5 mL 0     sulfamethoxazole-trimethoprim (BACTRIM DS) 800-160 MG tablet Take 1 tablet by mouth 2 times daily (Patient not taking: Reported on 1/3/2023) 20 tablet 0       Allergies  No Known Allergies    Social History  Social History     Socioeconomic History     Marital status:      Spouse name: Not on file     Number of children: Not on file     Years of education: Not on file     Highest education level: Not on file   Occupational History     Not on file   Tobacco Use     Smoking status: Never     Smokeless tobacco: Never   Substance and Sexual Activity     Alcohol use: Yes     Drug use: Never     Sexual activity: Not on file   Other Topics Concern     Not on file   Social History Narrative     Not on file     Social Determinants of Health     Financial Resource Strain: Not on file   Food Insecurity: Not on file   Transportation Needs: Not on file   Physical Activity: Not on file   Stress: Not on file   Social Connections: Not on file   Intimate Partner Violence: Not on file   Housing Stability: Not on file       Family History  Family History   Problem Relation Age of Onset     Glaucoma Mother      Glaucoma Father        Review of Systems  The complete review of systems is negative other than noted in the HPI or here.   Anesthesia Evaluation   Pt has had prior anesthetic. Type: General.    No history of anesthetic complications       ROS/MED HX  ENT/Pulmonary: Comment: Acquired stenosis of both  nasolacrimal ducts   (-) tobacco use   Neurologic:     (+) migraines (ocular ),  (-) no seizures, no CVA and no TIA   Cardiovascular:     (+) Dyslipidemia -----Previous cardiac testing   Echo: Date: Results:    Stress Test: Date: Results:    ECG Reviewed: Date: 2019 Results:  SR  Cath: Date: Results:      METS/Exercise Tolerance: >4 METS    Hematologic:  - neg hematologic  ROS     Musculoskeletal:  - neg musculoskeletal ROS     GI/Hepatic:  - neg GI/hepatic ROS  (-) GERD   Renal/Genitourinary:  - neg Renal ROS     Endo: Comment: Pre-diabetes      Psychiatric/Substance Use:  - neg psychiatric ROS     Infectious Disease:  - neg infectious disease ROS     Malignancy:  - neg malignancy ROS     Other:  - neg other ROS          Virtual visit -  No vitals were obtained    Physical Exam  Constitutional: Awake, alert, cooperative, no apparent distress, and appears stated age.  Eyes: Pupils equal  HENT: Normocephalic  Respiratory: non labored breathing   Neurologic: Awake, alert, oriented to name, place and time.   Neuropsychiatric: Calm, cooperative. Normal affect.      Prior Labs/Diagnostic Studies   All labs and imaging personally reviewed   Comprehensive Metabolic Panel  Specimen:  Blood - Venous blood (substance)   Ref Range & Units 10 mo ago Comments   Blood Urea Nitrogen 6 - 23 mg/dL 13     Sodium 136 - 145 mmol/L 144     Potassium 3.4 - 5.1 mmol/L 4.7     Chloride 96 - 105 mmol/L 106 High      CO2, Total 22 - 29 mmol/L 25  This test has been renamed to CO2, Total to more accurately reflect the species detected (including bicarbonate, carbonic acid, and carbonate). The test has not changed and bicarbonate is the predominant species detected.   Anion Gap 7 - 15 mmol/L 13     Glucose 70 - 180 mg/dL 102     Creatinine 0.50 - 1.10 mg/dL 0.81     eGFR >=60 mL/min/1.73 sqm >60     Albumin 3.8 - 5.0 g/dL 4.6     Protein, Total 6.1 - 8.2 g/dL 7.1     Calcium 8.6 - 10.2 mg/dL 9.5     Alkaline Phosphatase 35 - 104 U/L 67      Aspartate Aminotransferase 11 - 33 U/L 23     Bilirubin Total 0.2 - 1.2 mg/dL 0.4     Alanine Aminotransferase 6 - 37 U/L 15     Resulting Agency  [facility]    Specimen Collected: 03/09/22  8:31 AM Last Resulted: 03/09/22 11:38 AM   Hemoglobin A1C  Specimen:  Blood - Venous blood (substance)   Ref Range & Units 10 mo ago Comments   Hemoglobin A1C <=5.6 % 5.9 High      Estimated Average Glucose mg/dL 123  Estimated Average Glucose is a calculated result based on the A1C level and is used to relate results to the everyday glucose monitoring levels.   Resulting Agency  [facility]    Narrative  Performed by [facility]  Prediabetes: 5.7-6.4%     Diabetes: >/=6.5%   Specimen Collected: 03/09/22  8:31 AM Last Resulted: 03/09/22 11:28 AM       EKG/ stress test - if available please see in ROS above       The patient's records and results personally reviewed by this provider.     Outside records reviewed from: Care Everywhere      Assessment      Ela Cardenas is a 67 year old female seen as a PAC referral for risk assessment and optimization for anesthesia.    Plan/Recommendations  Pt will be optimized for the proposed procedure.  See below for details on the assessment, risk, and preoperative recommendations    NEUROLOGY  - ocular migraine - no issues for years.   -Post Op delirium risk factors:  No risk identified    ENT  - No current airway concerns.  Will need to be reassessed day of surgery.  Mallampati: Unable to assess  TM: Unable to assess   ~ Acquired stenosis of both nasolacrimal ducts - procedure as above.     CARDIAC  - Hyperlipidemia  Well controlled on home regimen  - METS (Metabolic Equivalents)  Patient performs 4 or more METS exercise without symptoms            Total Score: 0      RCRI-Very low risk: Class 1 0.4% complication rate            Total Score: 0        PULMONARY  - Obstructive Sleep Apnea  No current risk of obstructive sleep  "apnea   KONRAD Low Risk            Total Score: 1    KONRAD: Over 50 ys old      - Denies asthma or inhaler use  - Tobacco History      History   Smoking Status     Never   Smokeless Tobacco     Never       GI  PONV High Risk  Total Score: 3           1 AN PONV: Pt is Female    1 AN PONV: Patient is not a current smoker    1 AN PONV: Intended Post Op Opioids        /RENAL  - Baseline Creatinine  0.81    ENDOCRINE    - BMI: Estimated body mass index is 21.26 kg/m  as calculated from the following:    Height as of 4/20/22: 1.6 m (5' 3\").    Weight as of 4/20/22: 54.4 kg (120 lb).  Healthy Weight (BMI 18.5-24.9)  - pre-diabetes - diet and exercise controlled. A1c was 5.9 on 3/9/22.     HEME  VTE Low Risk 0.26%            Total Score: 1    VTE: Greater than 59 yrs old      - No history of abnormal bleeding or antiplatelet use.      Different anesthesia methods/types have been discussed with the patient, but they are aware that the final plan will be decided by the assigned anesthesia provider on the date of service.    The patient is optimized for their procedure. AVS with information on surgery time/arrival time, meds and NPO status given by nursing staff. No further diagnostic testing indicated.    Please refer to the physical examination documented by the anesthesiologist in the anesthesia record on the day of surgery.    Video-Visit Details    Type of service:  Video Visit    Provider received verbal consent for a Video Visit from the patient? Yes   Video Start Time: 3:13  Video End Time:3:19    Originating Location (pt. Location): Home    Distant Location (provider location):  Off-site  Mode of Communication:  Video Conference via [employer]  On the day of service:     Prep time: 7 minutes  Visit time: 7 minutes  Documentation time: 4 minutes  ------------------------------------------  Total time: 18 minutes      Dana Purvis PA-C  Preoperative Assessment Center  [facility]  Clinic and Surgery " [place]  Phone: [phone]  Fax: [phone]

## 2023-01-03 NOTE — H&P (VIEW-ONLY)
Pre-Operative H & P     CC:  Preoperative exam to assess for increased cardiopulmonary risk while undergoing surgery and anesthesia.    Date of Encounter: 1/3/2023  Primary Care Physician:  Tate Samayoa     Reason for visit:   Encounter Diagnoses   Name Primary?     Pre-op examination Yes     Acquired stenosis of both nasolacrimal ducts        HPI  Ela Cardenas is a 67 year old female who presents for pre-operative H & P in preparation for  Procedure Information     Case: [id number] Date/Time: 01/11/23 0715    Procedure: LEFT DACRYOCYSTORHINOSTOMY-ENDOSCOPIC WITH MITOMYCIN AND STENT (Left: Eye)    Anesthesia type: General    Diagnosis: Acquired stenosis of both nasolacrimal ducts [H04.553]    Pre-op diagnosis: Acquired stenosis of both nasolacrimal ducts [H04.553]    Location: [place] [address] / [facility]    Providers: Reinier Paul MD          The patient is a 67-year-old woman with a past medical history significant for hyperlipidemia, prediabetes, Dupuytren's contracture and history of stenosis of bilateral nasolacrimal ducts.  The patient underwent bilateral dacryocystorhinostomy with silicone intubation on 4/20/2022.  She has continued to follow with Dr. Paul and was last seen on 11/7/2022 and counseled for the procedure as above.    History is obtained from the patient and chart review    Hx of abnormal bleeding or anti-platelet use: none    Menstrual history: No LMP recorded. Patient is postmenopausal.:      Past Medical History  Past Medical History:   Diagnosis Date     Acquired stenosis of both nasolacrimal ducts      Dupuytren's contracture      HLD (hyperlipidemia)      Ocular migraine      Pre-diabetes        Past Surgical History  Past Surgical History:   Procedure Laterality Date     DACRYOCYSTORHINOSTOMY Bilateral 04/20/2022    Procedure: BILATERAL DACRYOCYSTORHINOSTOMY with silicone intubation;  Surgeon: Reinier Paul MD;  Location:  [facility] OR     DILATION AND CURETTAGE, DIAGNOSTIC / THERAPEUTIC      x2     EXCISE BREAST CYST/FIBROADENOMA/TUMOR/DUCT LESION/NIPPLE LESION/AREOLAR LESION  1985       Prior to Admission Medications  Current Outpatient Medications   Medication Sig Dispense Refill     atorvastatin (LIPITOR) 10 MG tablet Take 10 mg by mouth every evening       ofloxacin (OCUFLOX) 0.3 % ophthalmic solution Place 1-2 drops Into the left eye 4 times daily (Patient taking differently: Place 1-2 drops Into the left eye 2 times daily) 5 mL 0     sulfamethoxazole-trimethoprim (BACTRIM DS) 800-160 MG tablet Take 1 tablet by mouth 2 times daily (Patient not taking: Reported on 1/3/2023) 20 tablet 0       Allergies  No Known Allergies    Social History  Social History     Socioeconomic History     Marital status:      Spouse name: Not on file     Number of children: Not on file     Years of education: Not on file     Highest education level: Not on file   Occupational History     Not on file   Tobacco Use     Smoking status: Never     Smokeless tobacco: Never   Substance and Sexual Activity     Alcohol use: Yes     Drug use: Never     Sexual activity: Not on file   Other Topics Concern     Not on file   Social History Narrative     Not on file     Social Determinants of Health     Financial Resource Strain: Not on file   Food Insecurity: Not on file   Transportation Needs: Not on file   Physical Activity: Not on file   Stress: Not on file   Social Connections: Not on file   Intimate Partner Violence: Not on file   Housing Stability: Not on file       Family History  Family History   Problem Relation Age of Onset     Glaucoma Mother      Glaucoma Father        Review of Systems  The complete review of systems is negative other than noted in the HPI or here.   Anesthesia Evaluation   Pt has had prior anesthetic. Type: General.    No history of anesthetic complications       ROS/MED HX  ENT/Pulmonary: Comment: Acquired stenosis of both  nasolacrimal ducts   (-) tobacco use   Neurologic:     (+) migraines (ocular ),  (-) no seizures, no CVA and no TIA   Cardiovascular:     (+) Dyslipidemia -----Previous cardiac testing   Echo: Date: Results:    Stress Test: Date: Results:    ECG Reviewed: Date: 2019 Results:  SR  Cath: Date: Results:      METS/Exercise Tolerance: >4 METS    Hematologic:  - neg hematologic  ROS     Musculoskeletal:  - neg musculoskeletal ROS     GI/Hepatic:  - neg GI/hepatic ROS  (-) GERD   Renal/Genitourinary:  - neg Renal ROS     Endo: Comment: Pre-diabetes      Psychiatric/Substance Use:  - neg psychiatric ROS     Infectious Disease:  - neg infectious disease ROS     Malignancy:  - neg malignancy ROS     Other:  - neg other ROS          Virtual visit -  No vitals were obtained    Physical Exam  Constitutional: Awake, alert, cooperative, no apparent distress, and appears stated age.  Eyes: Pupils equal  HENT: Normocephalic  Respiratory: non labored breathing   Neurologic: Awake, alert, oriented to name, place and time.   Neuropsychiatric: Calm, cooperative. Normal affect.      Prior Labs/Diagnostic Studies   All labs and imaging personally reviewed   Comprehensive Metabolic Panel  Specimen:  Blood - Venous blood (substance)   Ref Range & Units 10 mo ago Comments   Blood Urea Nitrogen 6 - 23 mg/dL 13     Sodium 136 - 145 mmol/L 144     Potassium 3.4 - 5.1 mmol/L 4.7     Chloride 96 - 105 mmol/L 106 High      CO2, Total 22 - 29 mmol/L 25  This test has been renamed to CO2, Total to more accurately reflect the species detected (including bicarbonate, carbonic acid, and carbonate). The test has not changed and bicarbonate is the predominant species detected.   Anion Gap 7 - 15 mmol/L 13     Glucose 70 - 180 mg/dL 102     Creatinine 0.50 - 1.10 mg/dL 0.81     eGFR >=60 mL/min/1.73 sqm >60     Albumin 3.8 - 5.0 g/dL 4.6     Protein, Total 6.1 - 8.2 g/dL 7.1     Calcium 8.6 - 10.2 mg/dL 9.5     Alkaline Phosphatase 35 - 104 U/L 67      Aspartate Aminotransferase 11 - 33 U/L 23     Bilirubin Total 0.2 - 1.2 mg/dL 0.4     Alanine Aminotransferase 6 - 37 U/L 15     Resulting Agency  [facility]    Specimen Collected: 03/09/22  8:31 AM Last Resulted: 03/09/22 11:38 AM   Hemoglobin A1C  Specimen:  Blood - Venous blood (substance)   Ref Range & Units 10 mo ago Comments   Hemoglobin A1C <=5.6 % 5.9 High      Estimated Average Glucose mg/dL 123  Estimated Average Glucose is a calculated result based on the A1C level and is used to relate results to the everyday glucose monitoring levels.   Resulting Agency  [facility]    Narrative  Performed by [facility]  Prediabetes: 5.7-6.4%     Diabetes: >/=6.5%   Specimen Collected: 03/09/22  8:31 AM Last Resulted: 03/09/22 11:28 AM       EKG/ stress test - if available please see in ROS above       The patient's records and results personally reviewed by this provider.     Outside records reviewed from: Care Everywhere      Assessment      Ela Cardenas is a 67 year old female seen as a PAC referral for risk assessment and optimization for anesthesia.    Plan/Recommendations  Pt will be optimized for the proposed procedure.  See below for details on the assessment, risk, and preoperative recommendations    NEUROLOGY  - ocular migraine - no issues for years.   -Post Op delirium risk factors:  No risk identified    ENT  - No current airway concerns.  Will need to be reassessed day of surgery.  Mallampati: Unable to assess  TM: Unable to assess   ~ Acquired stenosis of both nasolacrimal ducts - procedure as above.     CARDIAC  - Hyperlipidemia  Well controlled on home regimen  - METS (Metabolic Equivalents)  Patient performs 4 or more METS exercise without symptoms            Total Score: 0      RCRI-Very low risk: Class 1 0.4% complication rate            Total Score: 0        PULMONARY  - Obstructive Sleep Apnea  No current risk of obstructive sleep  "apnea   KONRAD Low Risk            Total Score: 1    KONRAD: Over 50 ys old      - Denies asthma or inhaler use  - Tobacco History      History   Smoking Status     Never   Smokeless Tobacco     Never       GI  PONV High Risk  Total Score: 3           1 AN PONV: Pt is Female    1 AN PONV: Patient is not a current smoker    1 AN PONV: Intended Post Op Opioids        /RENAL  - Baseline Creatinine  0.81    ENDOCRINE    - BMI: Estimated body mass index is 21.26 kg/m  as calculated from the following:    Height as of 4/20/22: 1.6 m (5' 3\").    Weight as of 4/20/22: 54.4 kg (120 lb).  Healthy Weight (BMI 18.5-24.9)  - pre-diabetes - diet and exercise controlled. A1c was 5.9 on 3/9/22.     HEME  VTE Low Risk 0.26%            Total Score: 1    VTE: Greater than 59 yrs old      - No history of abnormal bleeding or antiplatelet use.      Different anesthesia methods/types have been discussed with the patient, but they are aware that the final plan will be decided by the assigned anesthesia provider on the date of service.    The patient is optimized for their procedure. AVS with information on surgery time/arrival time, meds and NPO status given by nursing staff. No further diagnostic testing indicated.    Please refer to the physical examination documented by the anesthesiologist in the anesthesia record on the day of surgery.    Video-Visit Details    Type of service:  Video Visit    Provider received verbal consent for a Video Visit from the patient? Yes   Video Start Time: 3:13  Video End Time:3:19    Originating Location (pt. Location): Home    Distant Location (provider location):  Off-site  Mode of Communication:  Video Conference via [employer]  On the day of service:     Prep time: 7 minutes  Visit time: 7 minutes  Documentation time: 4 minutes  ------------------------------------------  Total time: 18 minutes      Dana Purvis PA-C  Preoperative Assessment Center  [facility]  Clinic and Surgery " [place]  Phone: [phone]  Fax: [phone]

## 2023-01-03 NOTE — PROGRESS NOTES
Ela is a 67 year old who is being evaluated via a billable video visit.      How would you like to obtain your AVS? Email [email or web address]      Pre-Op Exam (/)      HPI       Review of Systems         Objective    Vitals - Patient Reported  Pain Score: No Pain (0)        Physical Exam     BRITANY Machado LPN

## 2023-01-09 DIAGNOSIS — H04.553 ACQUIRED STENOSIS OF BOTH NASOLACRIMAL DUCTS: Primary | ICD-10-CM

## 2023-01-11 ENCOUNTER — ANESTHESIA (OUTPATIENT)
Dept: SURGERY | Facility: AMBULATORY SURGERY CENTER | Age: 68
End: 2023-01-11
Payer: MEDICARE

## 2023-01-11 ENCOUNTER — HOSPITAL ENCOUNTER (OUTPATIENT)
Facility: AMBULATORY SURGERY CENTER | Age: 68
Discharge: HOME | End: 2023-01-11
Attending: OPHTHALMOLOGY
Payer: MEDICARE

## 2023-01-11 VITALS
HEIGHT: 63 IN | HEART RATE: 72 BPM | BODY MASS INDEX: 20.38 KG/M2 | WEIGHT: 115 LBS | OXYGEN SATURATION: 97 % | RESPIRATION RATE: 16 BRPM | DIASTOLIC BLOOD PRESSURE: 90 MMHG | SYSTOLIC BLOOD PRESSURE: 140 MMHG | TEMPERATURE: 98.9 F

## 2023-01-11 DIAGNOSIS — H04.553 ACQUIRED STENOSIS OF BOTH NASOLACRIMAL DUCTS: Primary | ICD-10-CM

## 2023-01-11 PROCEDURE — 31239 NSL/SINUS ENDOSCOPY SURG DCR: CPT | Mod: LT

## 2023-01-11 PROCEDURE — 68815 PROBE NASOLACRIMAL DUCT: CPT | Mod: LT | Performed by: OPHTHALMOLOGY

## 2023-01-11 PROCEDURE — 68815 PROBE NASOLACRIMAL DUCT: CPT | Mod: LT

## 2023-01-11 PROCEDURE — 31239 NSL/SINUS ENDOSCOPY SURG DCR: CPT | Mod: LT | Performed by: OPHTHALMOLOGY

## 2023-01-11 RX ORDER — DEXAMETHASONE SODIUM PHOSPHATE 4 MG/ML
INJECTION, SOLUTION INTRA-ARTICULAR; INTRALESIONAL; INTRAMUSCULAR; INTRAVENOUS; SOFT TISSUE PRN
Status: DISCONTINUED | OUTPATIENT
Start: 2023-01-11 | End: 2023-01-11

## 2023-01-11 RX ORDER — OXYCODONE HYDROCHLORIDE 5 MG/1
10 TABLET ORAL EVERY 4 HOURS PRN
Status: DISCONTINUED | OUTPATIENT
Start: 2023-01-11 | End: 2023-01-12 | Stop reason: HOSPADM

## 2023-01-11 RX ORDER — FENTANYL CITRATE 50 UG/ML
50 INJECTION, SOLUTION INTRAMUSCULAR; INTRAVENOUS EVERY 5 MIN PRN
Status: DISCONTINUED | OUTPATIENT
Start: 2023-01-11 | End: 2023-01-11 | Stop reason: HOSPADM

## 2023-01-11 RX ORDER — PROPOFOL 10 MG/ML
INJECTION, EMULSION INTRAVENOUS CONTINUOUS PRN
Status: DISCONTINUED | OUTPATIENT
Start: 2023-01-11 | End: 2023-01-11

## 2023-01-11 RX ORDER — SODIUM CHLORIDE, SODIUM LACTATE, POTASSIUM CHLORIDE, CALCIUM CHLORIDE 600; 310; 30; 20 MG/100ML; MG/100ML; MG/100ML; MG/100ML
INJECTION, SOLUTION INTRAVENOUS CONTINUOUS
Status: DISCONTINUED | OUTPATIENT
Start: 2023-01-11 | End: 2023-01-11 | Stop reason: HOSPADM

## 2023-01-11 RX ORDER — COCAINE HYDROCHLORIDE 40 MG/ML
SOLUTION NASAL PRN
Status: DISCONTINUED | OUTPATIENT
Start: 2023-01-11 | End: 2023-01-11 | Stop reason: HOSPADM

## 2023-01-11 RX ORDER — OXYCODONE HYDROCHLORIDE 5 MG/1
5 TABLET ORAL EVERY 6 HOURS PRN
Qty: 10 TABLET | Refills: 0 | Status: SHIPPED | OUTPATIENT
Start: 2023-01-11 | End: 2023-01-14

## 2023-01-11 RX ORDER — HYDROMORPHONE HYDROCHLORIDE 1 MG/ML
0.4 INJECTION, SOLUTION INTRAMUSCULAR; INTRAVENOUS; SUBCUTANEOUS EVERY 5 MIN PRN
Status: DISCONTINUED | OUTPATIENT
Start: 2023-01-11 | End: 2023-01-11 | Stop reason: HOSPADM

## 2023-01-11 RX ORDER — LIDOCAINE HYDROCHLORIDE AND EPINEPHRINE 10; 10 MG/ML; UG/ML
INJECTION, SOLUTION INFILTRATION; PERINEURAL PRN
Status: DISCONTINUED | OUTPATIENT
Start: 2023-01-11 | End: 2023-01-11 | Stop reason: HOSPADM

## 2023-01-11 RX ORDER — HYDROMORPHONE HYDROCHLORIDE 1 MG/ML
0.2 INJECTION, SOLUTION INTRAMUSCULAR; INTRAVENOUS; SUBCUTANEOUS EVERY 5 MIN PRN
Status: DISCONTINUED | OUTPATIENT
Start: 2023-01-11 | End: 2023-01-11 | Stop reason: HOSPADM

## 2023-01-11 RX ORDER — FENTANYL CITRATE 50 UG/ML
INJECTION, SOLUTION INTRAMUSCULAR; INTRAVENOUS PRN
Status: DISCONTINUED | OUTPATIENT
Start: 2023-01-11 | End: 2023-01-11

## 2023-01-11 RX ORDER — ECHINACEA PURPUREA EXTRACT 125 MG
1 TABLET ORAL 2 TIMES DAILY
Qty: 15 ML | Refills: 0 | Status: SHIPPED | OUTPATIENT
Start: 2023-01-11

## 2023-01-11 RX ORDER — ACETAMINOPHEN 325 MG/1
975 TABLET ORAL ONCE
Status: COMPLETED | OUTPATIENT
Start: 2023-01-11 | End: 2023-01-11

## 2023-01-11 RX ORDER — NEOMYCIN POLYMYXIN B SULFATES AND DEXAMETHASONE 3.5; 10000; 1 MG/ML; [USP'U]/ML; MG/ML
1 SUSPENSION/ DROPS OPHTHALMIC 4 TIMES DAILY
Qty: 5 ML | Refills: 0 | Status: SHIPPED | OUTPATIENT
Start: 2023-01-11

## 2023-01-11 RX ORDER — FENTANYL CITRATE 50 UG/ML
25 INJECTION, SOLUTION INTRAMUSCULAR; INTRAVENOUS
Status: DISCONTINUED | OUTPATIENT
Start: 2023-01-11 | End: 2023-01-12 | Stop reason: HOSPADM

## 2023-01-11 RX ORDER — CEPHALEXIN 500 MG/1
500 CAPSULE ORAL 2 TIMES DAILY
Qty: 14 CAPSULE | Refills: 0 | Status: SHIPPED | OUTPATIENT
Start: 2023-01-11 | End: 2023-01-18

## 2023-01-11 RX ORDER — FENTANYL CITRATE 50 UG/ML
25 INJECTION, SOLUTION INTRAMUSCULAR; INTRAVENOUS EVERY 5 MIN PRN
Status: DISCONTINUED | OUTPATIENT
Start: 2023-01-11 | End: 2023-01-11 | Stop reason: HOSPADM

## 2023-01-11 RX ORDER — PROPOFOL 10 MG/ML
INJECTION, EMULSION INTRAVENOUS PRN
Status: DISCONTINUED | OUTPATIENT
Start: 2023-01-11 | End: 2023-01-11

## 2023-01-11 RX ORDER — ONDANSETRON 2 MG/ML
INJECTION INTRAMUSCULAR; INTRAVENOUS PRN
Status: DISCONTINUED | OUTPATIENT
Start: 2023-01-11 | End: 2023-01-11

## 2023-01-11 RX ORDER — LIDOCAINE 40 MG/G
CREAM TOPICAL
Status: DISCONTINUED | OUTPATIENT
Start: 2023-01-11 | End: 2023-01-11 | Stop reason: HOSPADM

## 2023-01-11 RX ORDER — SODIUM CHLORIDE, SODIUM LACTATE, POTASSIUM CHLORIDE, CALCIUM CHLORIDE 600; 310; 30; 20 MG/100ML; MG/100ML; MG/100ML; MG/100ML
INJECTION, SOLUTION INTRAVENOUS CONTINUOUS
Status: DISCONTINUED | OUTPATIENT
Start: 2023-01-11 | End: 2023-01-12 | Stop reason: HOSPADM

## 2023-01-11 RX ORDER — ONDANSETRON 4 MG/1
4 TABLET, ORALLY DISINTEGRATING ORAL EVERY 30 MIN PRN
Status: DISCONTINUED | OUTPATIENT
Start: 2023-01-11 | End: 2023-01-12 | Stop reason: HOSPADM

## 2023-01-11 RX ORDER — MEPERIDINE HYDROCHLORIDE 25 MG/ML
12.5 INJECTION INTRAMUSCULAR; INTRAVENOUS; SUBCUTANEOUS
Status: DISCONTINUED | OUTPATIENT
Start: 2023-01-11 | End: 2023-01-12 | Stop reason: HOSPADM

## 2023-01-11 RX ORDER — OXYCODONE HYDROCHLORIDE 5 MG/1
5 TABLET ORAL EVERY 4 HOURS PRN
Status: DISCONTINUED | OUTPATIENT
Start: 2023-01-11 | End: 2023-01-12 | Stop reason: HOSPADM

## 2023-01-11 RX ORDER — ONDANSETRON 2 MG/ML
4 INJECTION INTRAMUSCULAR; INTRAVENOUS EVERY 30 MIN PRN
Status: DISCONTINUED | OUTPATIENT
Start: 2023-01-11 | End: 2023-01-12 | Stop reason: HOSPADM

## 2023-01-11 RX ORDER — LIDOCAINE HYDROCHLORIDE 20 MG/ML
INJECTION, SOLUTION INFILTRATION; PERINEURAL PRN
Status: DISCONTINUED | OUTPATIENT
Start: 2023-01-11 | End: 2023-01-11

## 2023-01-11 RX ORDER — MITOMYCIN IN WATER/PF 0.2 MG/ML
1 SYRINGE (ML) OPHTHALMIC (EYE) ONCE
Status: COMPLETED | OUTPATIENT
Start: 2023-01-11 | End: 2023-01-11

## 2023-01-11 RX ORDER — TRIAMCINOLONE ACETONIDE 40 MG/ML
INJECTION, SUSPENSION INTRA-ARTICULAR; INTRAMUSCULAR PRN
Status: DISCONTINUED | OUTPATIENT
Start: 2023-01-11 | End: 2023-01-11 | Stop reason: HOSPADM

## 2023-01-11 RX ADMIN — PROPOFOL 150 MG: 10 INJECTION, EMULSION INTRAVENOUS at 07:29

## 2023-01-11 RX ADMIN — FENTANYL CITRATE 50 MCG: 50 INJECTION, SOLUTION INTRAMUSCULAR; INTRAVENOUS at 07:29

## 2023-01-11 RX ADMIN — OXYCODONE HYDROCHLORIDE 5 MG: 5 TABLET ORAL at 09:12

## 2023-01-11 RX ADMIN — ONDANSETRON 4 MG: 2 INJECTION INTRAMUSCULAR; INTRAVENOUS at 07:35

## 2023-01-11 RX ADMIN — Medication 0.2 MG: at 08:00

## 2023-01-11 RX ADMIN — DEXAMETHASONE SODIUM PHOSPHATE 4 MG: 4 INJECTION, SOLUTION INTRA-ARTICULAR; INTRALESIONAL; INTRAMUSCULAR; INTRAVENOUS; SOFT TISSUE at 07:35

## 2023-01-11 RX ADMIN — LIDOCAINE HYDROCHLORIDE 80 MG: 20 INJECTION, SOLUTION INFILTRATION; PERINEURAL at 07:29

## 2023-01-11 RX ADMIN — SODIUM CHLORIDE, SODIUM LACTATE, POTASSIUM CHLORIDE, CALCIUM CHLORIDE: 600; 310; 30; 20 INJECTION, SOLUTION INTRAVENOUS at 06:29

## 2023-01-11 RX ADMIN — PROPOFOL 150 MCG/KG/MIN: 10 INJECTION, EMULSION INTRAVENOUS at 07:29

## 2023-01-11 NOTE — ANESTHESIA CARE TRANSFER NOTE
Patient: Ela Cardenas    Procedure: Procedure(s):  LEFT DACRYOCYSTORHINOSTOMY-ENDOSCOPIC WITH MITOMYCIN AND STENT       Diagnosis: Acquired stenosis of both nasolacrimal ducts [H04.553]  Diagnosis Additional Information: No value filed.    Anesthesia Type:   General     Note:    Oropharynx: oropharynx clear of all foreign objects and spontaneously breathing  Level of Consciousness: awake  Oxygen Supplementation: face mask  Level of Supplemental Oxygen (L/min / FiO2): 6  Independent Airway: airway patency satisfactory and stable  Dentition: dentition unchanged  Vital Signs Stable: post-procedure vital signs reviewed and stable  Report to RN Given: handoff report given  Patient transferred to: PACU    Handoff Report: Identifed the Patient, Identified the Reponsible Provider, Reviewed the pertinent medical history, Discussed the surgical course, Reviewed Intra-OP anesthesia mangement and issues during anesthesia, Set expectations for post-procedure period and Allowed opportunity for questions and acknowledgement of understanding      Vitals:  Vitals Value Taken Time   /85    Temp 98.2    Pulse 83 01/11/23 0833   Resp 21 01/11/23 0833   SpO2 100 % 01/11/23 0833   Vitals shown include unvalidated device data.    Electronically Signed By: LARA HOWARD  January 11, 2023  8:35 AM

## 2023-01-11 NOTE — ANESTHESIA POSTPROCEDURE EVALUATION
Patient: Ela Cardenas    Procedure: Procedure(s):  LEFT DACRYOCYSTORHINOSTOMY-ENDOSCOPIC WITH MITOMYCIN AND STENT       Anesthesia Type:  General    Note:  Disposition: Outpatient   Postop Pain Control: Uneventful            Sign Out: Well controlled pain   PONV: No   Neuro/Psych: Uneventful            Sign Out: Acceptable/Baseline neuro status   Airway/Respiratory: Uneventful            Sign Out: Acceptable/Baseline resp. status   CV/Hemodynamics: Uneventful            Sign Out: Acceptable CV status   Other NRE: NONE   DID A NON-ROUTINE EVENT OCCUR? No           Last vitals:  Vitals Value Taken Time   /86 01/11/23 0847   Temp 36.2  C (97.2  F) 01/11/23 0845   Pulse 79 01/11/23 0852   Resp 6 01/11/23 0852   SpO2 98 % 01/11/23 0852   Vitals shown include unvalidated device data.    Electronically Signed By: Antonio Villavicencio MD  January 11, 2023  2:00 PM

## 2023-01-11 NOTE — BRIEF OP NOTE
[facility]    Brief Operative Note    Pre-operative diagnosis: Acquired stenosis of both nasolacrimal ducts [H04.553]  Post-operative diagnosis Same as pre-operative diagnosis    Procedure: Procedure(s):  LEFT DACRYOCYSTORHINOSTOMY-ENDOSCOPIC WITH MITOMYCIN AND STENT  Surgeon: Surgeon(s) and Role:     * Reinier Paul MD - Primary     * Kelly Farr MD - Resident - Assisting  Anesthesia: General   Estimated Blood Loss: Minimal    Drains: None  Specimens: * No specimens in log *  Findings:   as expected .  Complications: None.  Implants: * No implants in log *

## 2023-01-11 NOTE — DISCHARGE INSTRUCTIONS
Post-operative Instructions    Ophthalmic Plastic and Reconstructive Surgery  Reinier Paul M.D.  Kelly Farr M.D.    All instructions apply to the operated eye(s) or eyelid(s)    What to expect after surgery:  There will be some swelling, bruising, and likely a black eye (even into the lower eyelids and cheeks). Also expect crusting and discharge from the eye and/or incisions.   A small amount of surface bleeding is normal for the first 48 hours after surgery.  You may notice some bloody tears for the first few days after surgery. This is normal.  Your eye(s) and eyelid(s) may be painful and tender. This is normal after surgery. Use the pain medication as prescribed. If your pain does not improve despite the medication, contact the office.    Wound care and personal care:  Apply ice compresses 15 minutes on 15 minutes off while awake for the first 2 days after surgery, then switch to warm compresses 4 times a day until seen by your physician.   For warm packs you can place a cup of dry uncooked rice in a clean cotton sock. Place sock in microwave 30 seconds to one minute. Next place the warm sock into a plastic bag and wrap the bag with clean warm wet washcloth and place over operated eye.    You may shower or wash your hair the day after surgery. Do not bathe or go swimming for 1 week to prevent contamination of your wounds.  Do not apply make-up to the eyes or eyelids for 2 weeks after surgery.    Activity restrictions and driving:  Avoid heavy lifting, bending, exercise or strenuous activity for 1 week after surgery.  You may resume other activities and return to work as tolerated.  You may not resume driving until have you stopped using narcotic pain medications(such as Norco, Percocet, Tylenol #3).    Medications:  Restart all your regular home medications and eye drops today. If you take Plavix or Aspirin on a regular basis, wait for 3 days after your surgery before restarting these in order to  decrease the risk of bleeding complications.  Avoid aspirin and aspirin-like medications (Motrin, Aleve, Ibuprofen, [place] etc) for 5 days to reduce the risk of bleeding. You may take Tylenol (acetaminophen) for pain.  In addition to your home medications, take the following post-operative medications as prescribed by your physician:  Use [place] Nasal Spray, one spray in the left side twice a day until your clinic visit  Instill eye drops (Maxitrol) four times a day until the bottle finished.   Take oral antibiotics as directed  Take scheduled extra strength Tylenol for pain.  You may take 1 to 2 pain pills (norco or oxycodone as prescribed) as needed for breakthrough pain up to every 6 hours.  The pain pills may make you drowsy. You must not drive a car, operate heavy machinery or drink alcohol while taking them.  The pain pills may cause constipation and nausea. Take them with some food to prevent a stomach upset. If you continue to experience nausea, call your physician.    WARNING: All the prescription pain medications listed above contain Tylenol (acetaminophen). You must not take more than 4,000 mg of acetaminophen per 24-hour period. This is equivalent to 6 tablets of Darvocet, 8 tablets of Vicodin, or 12 tablets of Norco, Percocet or Tylenol #3. If you take other over-the-counter medications containing acetaminophen, you must take the amount of acetaminophen into account and reduce the number of prescribed pain pills accordingly.    Contact information and follow-up:  Return to the [facility] for a follow-up appointment with your physician as  scheduled. If no appointment has been scheduled, call [phone] for an  appointment with Dr. Paul within 1 to 2 weeks from your date of surgery.  -     Please email a few photos of your eye(s) or other operative site(s) to [email or web address] prior to your follow up visit.    For severe pain, bleeding, or loss of vision, call the Eye Clinic at  [phone].  After hours or on weekends and holidays, call [phone] and ask to speak with the ophthalmologist on call.    [facility]  [facility]  Home Care Following Anesthesia  For 24 hours after surgery:  Get plenty of rest.  A responsible adult must stay with you for at least 24 hours after you leave the surgery center.  Do not drive or use heavy equipment.  If you have weakness or tingling, don't drive or use heavy equipment until this feeling goes away.   Do not drink alcohol.   Avoid strenuous or risky activities.  Ask for help when climbing stairs.  You may feel lightheaded.  IF so, sit for a few minutes before standing.  Have someone help you get up.   If you have nausea (feel sick to your stomach): Drink only clear liquids such as apple juice, ginger ale, broth or 7-Up.  Rest may also help.  Be sure to drink enough fluids.  Move to a regular diet as you feel able.   You may have a slight fever.  Call the doctor if your fever is over 100 F (37.7 C) (taken under the tongue) or lasts longer than 24 hours.  You may have a dry mouth, a sore throat, muscle aches or trouble sleeping. These should go away after 24 hours.  Do not make important or legal decisions.   It is recommended to avoid smoking.               Tips for taking pain medications  To get the best pain relief possible, remember these points:  Take pain medications as directed, before pain becomes severe.  Pain medication can upset your stomach: taking it with food may help.  Constipation is a common side effect of pain medication. Drink plenty of  fluids.  Eat foods high in fiber. Take a stool softener if recommended by your doctor or pharmacist.  Do not drink alcohol, drive or operate machinery while taking pain medications.  Ask about other ways to control pain, such as with heat, ice or relaxation.    Tylenol/Acetaminophen Consumption  To help encourage the safe use  of acetaminophen, the makers of TYLENOL  have lowered the maximum daily dose for single-ingredient Extra Strength TYLENOL  (acetaminophen) products sold in the [place] from 8 pills per day (4,000 mg) to 6 pills per day (3,000 mg). The dosing interval has also changed from 2 pills every 4-6 hours to 2 pills every 6 hours.  If you feel your pain relief is insufficient, you may take Tylenol/Acetaminophen in addition to your narcotic pain medication.   Be careful not to exceed 3,000 mg of Tylenol/Acetaminophen in a 24 hour period from all sources.  If you are taking extra strength Tylenol/acetaminophen (500 mg), the maximum dose is 6 tablets in 24 hours.  If you are taking regular strength acetaminophen (325 mg), the maximum dose is 9 tablets in 24 hours.    Call a doctor for any of the following:  Signs of infection (fever, growing tenderness at the surgery site, a large amount of drainage or bleeding, severe pain, foul-smelling drainage, redness, swelling).  It has been over 8 to 10 hours since surgery and you are still not able to urinate (pass water).  Headache for over 24 hours.  Numbness, tingling or weakness the day after surgery (if you had spinal anesthesia).  Signs of Covid-19 infection (temperature over 100 degrees, shortness of breath, cough, loss of taste/smell, generalized body aches, persistent headache, chills, sore throat, nausea/vomiting/diarrhea)  Your doctor is:  Dr. Reinier Paul, Ophthalmology: [phone]                    Or dial [phone] and ask for the resident on call for:  Ophthalmology  For emergency care, call the:  [facility]:  [phone] (TTY for hearing impaired: [phone])

## 2023-01-11 NOTE — OP NOTE
PREOPERATIVE DIAGNOSIS: Left  complete nasolacrimal duct obstruction status post prior dacryocystorhinostomy   POSTOPERATIVE DIAGNOSIS: Left complete nasolacrimal duct obstruction status post prior dacryocystorhinostomy   PROCEDURE PERFORMED:Left  endoscopic dacryocystorhinostomy with stent with Mitomycin C.   SURGEON: Reinier Paul MD.   ASSISTANTS:  Kelly Farr MD and Carrie Wise MD  ANESTHESIA: General with local infiltration of 1% Lidocaine with epinephrine.   COMPLICATIONS: None.   ESTIMATED BLOOD LOSS: Less than 5 mL.   HISTORY AND INDICATIONS:  Ela Cardenas   presented with left complete nasolacrimal duct obstruction with tearing. After the risks, benefits and alternatives to the proposed procedures were explained, informed consent was obtained.   DESCRIPTION OF PROCEDURE: Ela Cardenas  was brought to the operating room and placed supine on the operating table. General anesthesia was induced. Cocaine-soaked neurosurgical cottonoids were placed in the middle meatus in left nostril. Ela Cardenas  was prepped and draped in the typical fashion for oculoplastic surgery. Attention was first directed to the left  side. The cottonoid was removed. A #1 Magana probe was placed through the upper  punctum after it was placed into the region of  the lacrimal sac intranasally. Using endoscopic guidance, an ophthalmic crescent knife was used to make an incision vertically through the nasal mucosa in the region of the lacrimal fossa. The mucosa and sac wall were widely open with biting forceps. Mitomycin C 0.4 mg/ml was placed in the ostium for 2 minutes.   The  silicone intubation set was used to intubate the upper and lower canalicular system and then retrieved in the nose. The stents were removed and the silicone tied in multiple square knots, trimmed and allowed to retract into the nare. Nasopor soaked in Kenalog was placed in the middle meatus.  The patient tolerated the procedures well and left the  operating room in stable condition.   KATHY ESQUIVEL MD

## 2023-01-11 NOTE — ANESTHESIA PREPROCEDURE EVALUATION
Anesthesia Pre-Procedure Evaluation    Patient: Ela Cardenas   MRN: [id number] : 1955        Procedure : Procedure(s):  LEFT DACRYOCYSTORHINOSTOMY-ENDOSCOPIC WITH MITOMYCIN AND STENT          Past Medical History:   Diagnosis Date     Acquired stenosis of both nasolacrimal ducts      HLD (hyperlipidemia)      Ocular migraine      Pre-diabetes       Past Surgical History:   Procedure Laterality Date     DACRYOCYSTORHINOSTOMY Bilateral 2022    Procedure: BILATERAL DACRYOCYSTORHINOSTOMY with silicone intubation;  Surgeon: Reinier Paul MD;  Location: [facility] OR     DILATION AND CURETTAGE, DIAGNOSTIC / THERAPEUTIC      x2     EXCISE BREAST CYST/FIBROADENOMA/TUMOR/DUCT LESION/NIPPLE LESION/AREOLAR LESION        No Known Allergies   Social History     Tobacco Use     Smoking status: Never     Smokeless tobacco: Never   Substance Use Topics     Alcohol use: Yes      Wt Readings from Last 1 Encounters:   23 52.2 kg (115 lb)        Anesthesia Evaluation            ROS/MED HX  ENT/Pulmonary:       Neurologic:     (+) migraines,     Cardiovascular:     (+) Dyslipidemia -----    METS/Exercise Tolerance:     Hematologic:       Musculoskeletal:       GI/Hepatic:       Renal/Genitourinary:       Endo:       Psychiatric/Substance Use:       Infectious Disease:       Malignancy:       Other:            Physical Exam    Airway  airway exam normal      Mallampati: II   TM distance: > 3 FB   Neck ROM: full   Mouth opening: > 3 cm    Respiratory Devices and Support         Dental  no notable dental history         Cardiovascular          Rhythm and rate: regular and normal     Pulmonary   pulmonary exam normal        breath sounds clear to auscultation           OUTSIDE LABS:  CBC: No results found for: WBC, HGB, HCT, PLT  BMP: No results found for: NA, POTASSIUM, CHLORIDE, CO2, BUN, CR, GLC  COAGS: No results found for: PTT, INR, FIBR  POC: No results found for: BGM, HCG, HCGS  HEPATIC: No results found for:  ALBUMIN, PROTTOTAL, ALT, AST, GGT, ALKPHOS, BILITOTAL, BILIDIRECT, OSCAR  OTHER: No results found for: PH, LACT, A1C, CHONG, PHOS, MAG, LIPASE, AMYLASE, TSH, T4, T3, CRP, SED    Anesthesia Plan    ASA Status:  1   NPO Status:  NPO Appropriate    Anesthesia Type: General.     - Airway: ETT   Induction: Intravenous.   Maintenance: Balanced.        Consents    Anesthesia Plan(s) and associated risks, benefits, and realistic alternatives discussed. Questions answered and patient/representative(s) expressed understanding.    - Discussed:     - Discussed with:  Patient      - Extended Intubation/Ventilatory Support Discussed: No.      - Patient is DNR/DNI Status: No    Use of blood products discussed: No .     Postoperative Care    Pain management: IV analgesics, Oral pain medications, Multi-modal analgesia.   PONV prophylaxis: Ondansetron (or other 5HT-3), Background Propofol Infusion     Comments:                Antonio Villavicencio MD

## 2023-01-12 NOTE — PROGRESS NOTES
POD1  A telephone call was made to Ela Cardenas to make sure the post operative course has been uneventful and that all questions were answered. There was no answer and a telephone message was left.    Kelly Farr MD

## 2023-01-30 ENCOUNTER — VIRTUAL VISIT (OUTPATIENT)
Dept: OPHTHALMOLOGY | Facility: CLINIC | Age: 68
End: 2023-01-30
Payer: MEDICARE

## 2023-01-30 DIAGNOSIS — Z98.890 POSTOPERATIVE EYE STATE: Primary | ICD-10-CM

## 2023-01-30 PROCEDURE — 99024 POSTOP FOLLOW-UP VISIT: CPT | Performed by: OPHTHALMOLOGY

## 2023-01-30 NOTE — PROGRESS NOTES
Ela is a 67 year old who is being evaluated via a billable telephone visit.      What phone number would you like to be contacted at? [phone]  How would you like to obtain your AVS? MyChart    Distant Location (provider location):  On-site      Subjective   Ela is a 67 year old presenting for the following health issues:  No chief complaint on file.      HPI     S/p L endoscopic DCR with MMC and stent  1/11/23  Patient is doing well, no issues, feels things are healing well. Using nasal saline spray BID    Review of Systems   Constitutional, HEENT, cardiovascular, pulmonary, gi and gu systems are negative, except as otherwise noted.      Objective           Vitals:  No vitals were obtained today due to virtual visit.    Physical Exam   healthy, alert and no distress  PSYCH: Alert and oriented times 3; coherent speech, normal   rate and volume, able to articulate logical thoughts, able   to abstract reason, no tangential thoughts, no hallucinations   or delusions  Her affect is normal  RESP: No cough, no audible wheezing, able to talk in full sentences  Remainder of exam unable to be completed due to telephone visits        ----- Service Performed and Documented by Resident or Fellow ------      S/p L endoscopic DCR with MMC and stent 1/11/23  - doing well  - continue nasal saline spray BID  - follow up 3/6/23 as scheduled for stent removal      Phone call duration: 10 minutes    Attending Physician Attestation:  I did not speak with the patient, but I reviewed the case with the resident or fellow and edited the care plan as necessary.   -Reinier Paul MD

## 2023-03-06 ENCOUNTER — OFFICE VISIT (OUTPATIENT)
Dept: OPHTHALMOLOGY | Facility: CLINIC | Age: 68
End: 2023-03-06
Payer: MEDICARE

## 2023-03-06 DIAGNOSIS — H04.553 ACQUIRED STENOSIS OF BOTH NASOLACRIMAL DUCTS: Primary | ICD-10-CM

## 2023-03-06 DIAGNOSIS — Z98.890 POSTOPERATIVE EYE STATE: ICD-10-CM

## 2023-03-06 PROCEDURE — 99024 POSTOP FOLLOW-UP VISIT: CPT | Mod: GC | Performed by: OPHTHALMOLOGY

## 2023-03-06 PROCEDURE — 31231 NASAL ENDOSCOPY DX: CPT | Mod: 79 | Performed by: OPHTHALMOLOGY

## 2023-03-06 PROCEDURE — 68801 DILATE TEAR DUCT OPENING: CPT | Mod: 79 | Performed by: OPHTHALMOLOGY

## 2023-03-06 ASSESSMENT — VISUAL ACUITY
OD_CC+: -1
OS_CC+: -2
OS_CC: 20/25
OD_CC: 20/30
CORRECTION_TYPE: GLASSES
METHOD: SNELLEN - LINEAR

## 2023-03-06 ASSESSMENT — TONOMETRY
OD_IOP_MMHG: 12
OS_IOP_MMHG: 12
IOP_METHOD: ICARE

## 2023-03-06 ASSESSMENT — EXTERNAL EXAM - LEFT EYE: OS_EXAM: NORMAL

## 2023-03-06 ASSESSMENT — SLIT LAMP EXAM - LIDS: COMMENTS: NORMAL

## 2023-03-06 ASSESSMENT — EXTERNAL EXAM - RIGHT EYE: OD_EXAM: NORMAL

## 2023-03-06 NOTE — NURSING NOTE
Chief Complaints and History of Present Illnesses   Patient presents with     Post Op (Ophthalmology) Left Eye     Left dacryocystorhinostomy-endoscopic with mitomycin and stent on 01/11/2023     Chief Complaint(s) and History of Present Illness(es)     Post Op (Ophthalmology) Left Eye            Laterality: left eye    Associated symptoms: redness, itching (mild) and discharge.  Negative for eye pain    Treatments tried: no treatments    Pain scale: 0/10    Comments: Left dacryocystorhinostomy-endoscopic with mitomycin and stent on 01/11/2023          Comments    Patient returns for a two month post operative left eye exam.  On Saturday morning her left eye was bright red/blood shot (which has resolved to a large degree).  For the pat week her left lids have been crusted when she wakes.  She does currently have a head cold.      Anisa Roberts, COT 11:06 AM  March 6, 2023

## 2023-03-06 NOTE — PROGRESS NOTES
Chief Complaints and History of Present Illnesses   Patient presents with     Post Op (Ophthalmology) Left Eye     Left dacryocystorhinostomy-endoscopic with mitomycin and stent on 01/11/2023     Chief Complaint(s) and History of Present Illness(es)     Post Op (Ophthalmology) Left Eye    In left eye.  Associated symptoms include redness, itching (mild) and   discharge (yellow).  Negative for eye pain.  Treatments tried include no   treatments.  Pain was noted as 0/10. Additional comments: Left   dacryocystorhinostomy-endoscopic with mitomycin and stent on 01/11/2023           Comments    Patient returns for a two month post operative left eye exam.  On Saturday   morning her left eye was bright red/blood shot (which has resolved to a   large degree).  For the pat week her left lids have been crusted when she   wakes.  She does currently have a head cold.      MARIEL Contreras 11:06 AM  March 6, 2023                         Assessment & Plan     Ela Cardenas is a 67 year old female with the following diagnoses:   1. Acquired stenosis of both nasolacrimal ducts    2. Postoperative eye state         ~ POM2 s/p left endo DCR with stent and MMC  - stent removed today, wide open on irrigation today. Osteotomy is open on nasal endoscopy with fluorescein dye visualized  - use nasal spray BID PRN  - follow up as needed            Kelly Farr MD  Oculoplastics Fellow  Attending Physician Attestation:  I have seen and examined this patient with the fellow .  I have confirmed and edited as necessary the chief complaint(s), history of present illness, review of systems, relevant history, and examination findings as documented by others.  I have personally reviewed the relevant tests, images, and reports as documented above.  I have confirmed and edited as necessary the assessment and plan and agree with this note.    - Reinier Paul MD 11:46 AM 3/6/2023

## (undated) DEVICE — LINEN TOWEL PACK X5 5464

## (undated) DEVICE — GLOVE PROTEXIS MICRO 7.5  2D73PM75

## (undated) DEVICE — BLADE KNIFE SURG 15 371115

## (undated) DEVICE — PACK MINOR EYE CUSTOM ASC

## (undated) DEVICE — ESU NDL COLORADO MICRO 3CM STR N103A

## (undated) DEVICE — PEN MARKING SKIN TYCO DEVON DUAL TIP 31145868

## (undated) DEVICE — SYR 03ML LL W/O NDL 309657

## (undated) DEVICE — ESU PENCIL W/COATED BLADE E2450H

## (undated) DEVICE — Device

## (undated) DEVICE — SOL NACL 0.9% INJ 1000ML BAG 2B1324X

## (undated) DEVICE — SPONGE COTTONOID 2X1/2" 80-1406

## (undated) DEVICE — TUBING SUCTION 12"X1/4" N612

## (undated) DEVICE — EYE ENDO ILLUMINATOR WIDE ANG CONSTELLATION 23GA 8065751184

## (undated) DEVICE — EYE PREP BETADINE 5% SOLUTION 30ML 0065-0411-30

## (undated) DEVICE — EYE KNIFE CRESCENT 55DEG 373807

## (undated) DEVICE — TIP NAVIGATOR SPETZLER MICRO CLAW UNIV 25KHZ 5450-800-315

## (undated) DEVICE — ESU GROUND PAD ADULT W/CORD E7507

## (undated) DEVICE — NDL 25GA 2"  8881200441

## (undated) DEVICE — EYE INTRODUCER SET FOR STOPLOSS JONES TUBES S1.7500

## (undated) RX ORDER — FENTANYL CITRATE 50 UG/ML
INJECTION, SOLUTION INTRAMUSCULAR; INTRAVENOUS
Status: DISPENSED
Start: 2022-04-20

## (undated) RX ORDER — LIDOCAINE HYDROCHLORIDE AND EPINEPHRINE 10; 10 MG/ML; UG/ML
INJECTION, SOLUTION INFILTRATION; PERINEURAL
Status: DISPENSED
Start: 2023-01-11

## (undated) RX ORDER — DEXAMETHASONE SODIUM PHOSPHATE 4 MG/ML
INJECTION, SOLUTION INTRA-ARTICULAR; INTRALESIONAL; INTRAMUSCULAR; INTRAVENOUS; SOFT TISSUE
Status: DISPENSED
Start: 2022-04-20

## (undated) RX ORDER — COCAINE HYDROCHLORIDE 40 MG/ML
SOLUTION NASAL
Status: DISPENSED
Start: 2022-04-20

## (undated) RX ORDER — ACETAMINOPHEN 325 MG/1
TABLET ORAL
Status: DISPENSED
Start: 2022-04-20

## (undated) RX ORDER — FENTANYL CITRATE 50 UG/ML
INJECTION, SOLUTION INTRAMUSCULAR; INTRAVENOUS
Status: DISPENSED
Start: 2023-01-11

## (undated) RX ORDER — ERYTHROMYCIN 5 MG/G
OINTMENT OPHTHALMIC
Status: DISPENSED
Start: 2023-01-11

## (undated) RX ORDER — TRIAMCINOLONE ACETONIDE 40 MG/ML
INJECTION, SUSPENSION INTRA-ARTICULAR; INTRAMUSCULAR
Status: DISPENSED
Start: 2023-01-11

## (undated) RX ORDER — PROPOFOL 10 MG/ML
INJECTION, EMULSION INTRAVENOUS
Status: DISPENSED
Start: 2022-04-20

## (undated) RX ORDER — COCAINE HYDROCHLORIDE 40 MG/ML
SOLUTION NASAL
Status: DISPENSED
Start: 2023-01-11

## (undated) RX ORDER — ACETAMINOPHEN 325 MG/1
TABLET ORAL
Status: DISPENSED
Start: 2023-01-11

## (undated) RX ORDER — ONDANSETRON 2 MG/ML
INJECTION INTRAMUSCULAR; INTRAVENOUS
Status: DISPENSED
Start: 2023-01-11

## (undated) RX ORDER — OXYCODONE HYDROCHLORIDE 5 MG/1
TABLET ORAL
Status: DISPENSED
Start: 2023-01-11

## (undated) RX ORDER — ONDANSETRON 2 MG/ML
INJECTION INTRAMUSCULAR; INTRAVENOUS
Status: DISPENSED
Start: 2022-04-20

## (undated) RX ORDER — GENTAMICIN 40 MG/ML
INJECTION, SOLUTION INTRAMUSCULAR; INTRAVENOUS
Status: DISPENSED
Start: 2023-01-11

## (undated) RX ORDER — DEXAMETHASONE SODIUM PHOSPHATE 4 MG/ML
INJECTION, SOLUTION INTRA-ARTICULAR; INTRALESIONAL; INTRAMUSCULAR; INTRAVENOUS; SOFT TISSUE
Status: DISPENSED
Start: 2023-01-11

## (undated) RX ORDER — FENTANYL CITRATE-0.9 % NACL/PF 10 MCG/ML
PLASTIC BAG, INJECTION (ML) INTRAVENOUS
Status: DISPENSED
Start: 2022-04-20

## (undated) RX ORDER — PROPOFOL 10 MG/ML
INJECTION, EMULSION INTRAVENOUS
Status: DISPENSED
Start: 2023-01-11

## (undated) RX ORDER — TETRACAINE HYDROCHLORIDE 5 MG/ML
SOLUTION OPHTHALMIC
Status: DISPENSED
Start: 2023-01-11

## (undated) RX ORDER — GLYCOPYRROLATE 0.2 MG/ML
INJECTION INTRAMUSCULAR; INTRAVENOUS
Status: DISPENSED
Start: 2022-04-20

## (undated) RX ORDER — LIDOCAINE HYDROCHLORIDE 20 MG/ML
INJECTION, SOLUTION EPIDURAL; INFILTRATION; INTRACAUDAL; PERINEURAL
Status: DISPENSED
Start: 2022-04-20